# Patient Record
Sex: MALE | Race: WHITE | URBAN - METROPOLITAN AREA
[De-identification: names, ages, dates, MRNs, and addresses within clinical notes are randomized per-mention and may not be internally consistent; named-entity substitution may affect disease eponyms.]

---

## 2017-03-19 ENCOUNTER — EMERGENCY (EMERGENCY)
Facility: HOSPITAL | Age: 62
LOS: 0 days | Discharge: HOME | End: 2017-03-20
Admitting: FAMILY MEDICINE

## 2017-03-22 ENCOUNTER — INPATIENT (INPATIENT)
Facility: HOSPITAL | Age: 62
LOS: 9 days | Discharge: HOME | End: 2017-04-01
Attending: INTERNAL MEDICINE | Admitting: FAMILY MEDICINE

## 2017-04-07 PROBLEM — Z00.00 ENCOUNTER FOR PREVENTIVE HEALTH EXAMINATION: Status: ACTIVE | Noted: 2017-04-07

## 2017-04-24 ENCOUNTER — APPOINTMENT (OUTPATIENT)
Dept: CARDIOLOGY | Facility: CLINIC | Age: 62
End: 2017-04-24

## 2017-04-24 VITALS
HEIGHT: 71 IN | DIASTOLIC BLOOD PRESSURE: 76 MMHG | HEART RATE: 84 BPM | BODY MASS INDEX: 33.56 KG/M2 | SYSTOLIC BLOOD PRESSURE: 177 MMHG | WEIGHT: 239.7 LBS | OXYGEN SATURATION: 98 %

## 2017-04-24 DIAGNOSIS — F15.90 OTHER STIMULANT USE, UNSPECIFIED, UNCOMPLICATED: ICD-10-CM

## 2017-04-24 DIAGNOSIS — Z78.9 OTHER SPECIFIED HEALTH STATUS: ICD-10-CM

## 2017-04-24 DIAGNOSIS — Z80.9 FAMILY HISTORY OF MALIGNANT NEOPLASM, UNSPECIFIED: ICD-10-CM

## 2017-04-24 DIAGNOSIS — Z82.49 FAMILY HISTORY OF ISCHEMIC HEART DISEASE AND OTHER DISEASES OF THE CIRCULATORY SYSTEM: ICD-10-CM

## 2017-04-24 RX ORDER — FUROSEMIDE 40 MG/1
40 TABLET ORAL
Qty: 90 | Refills: 0 | Status: ACTIVE | COMMUNITY
Start: 2017-02-13

## 2017-04-24 RX ORDER — FLUTICASONE PROPIONATE 50 UG/1
50 SPRAY, METERED NASAL
Qty: 16 | Refills: 0 | Status: ACTIVE | COMMUNITY
Start: 2017-04-17

## 2017-06-03 PROBLEM — Z82.49 FAMILY HISTORY OF CONGESTIVE HEART FAILURE: Status: ACTIVE | Noted: 2017-04-24

## 2017-06-03 PROBLEM — Z80.9 FAMILY HISTORY OF MALIGNANT NEOPLASM: Status: ACTIVE | Noted: 2017-04-24

## 2017-06-03 PROBLEM — F15.90 CAFFEINE USE: Status: ACTIVE | Noted: 2017-04-24

## 2017-06-03 PROBLEM — Z78.9 SOCIAL ALCOHOL USE: Status: ACTIVE | Noted: 2017-04-24

## 2017-06-03 RX ORDER — RIVAROXABAN 20 MG/1
20 TABLET, FILM COATED ORAL
Qty: 90 | Refills: 0 | Status: ACTIVE | COMMUNITY
Start: 2016-09-27

## 2017-06-03 RX ORDER — SPIRONOLACTONE 25 MG/1
25 TABLET ORAL
Qty: 90 | Refills: 0 | Status: ACTIVE | COMMUNITY
Start: 2017-02-13

## 2017-06-03 RX ORDER — LOSARTAN POTASSIUM 100 MG/1
100 TABLET, FILM COATED ORAL
Qty: 90 | Refills: 0 | Status: ACTIVE | COMMUNITY
Start: 2017-02-13

## 2017-06-03 RX ORDER — SIMVASTATIN 40 MG/1
40 TABLET, FILM COATED ORAL
Qty: 90 | Refills: 0 | Status: ACTIVE | COMMUNITY
Start: 2017-02-13

## 2017-06-27 DIAGNOSIS — I16.1 HYPERTENSIVE EMERGENCY: ICD-10-CM

## 2017-06-27 DIAGNOSIS — J44.9 CHRONIC OBSTRUCTIVE PULMONARY DISEASE, UNSPECIFIED: ICD-10-CM

## 2017-06-27 DIAGNOSIS — J45.909 UNSPECIFIED ASTHMA, UNCOMPLICATED: ICD-10-CM

## 2017-06-27 DIAGNOSIS — I50.23 ACUTE ON CHRONIC SYSTOLIC (CONGESTIVE) HEART FAILURE: ICD-10-CM

## 2017-06-27 DIAGNOSIS — I21.4 NON-ST ELEVATION (NSTEMI) MYOCARDIAL INFARCTION: ICD-10-CM

## 2017-06-27 DIAGNOSIS — Z96.641 PRESENCE OF RIGHT ARTIFICIAL HIP JOINT: ICD-10-CM

## 2017-06-27 DIAGNOSIS — E78.5 HYPERLIPIDEMIA, UNSPECIFIED: ICD-10-CM

## 2017-06-27 DIAGNOSIS — Z79.01 LONG TERM (CURRENT) USE OF ANTICOAGULANTS: ICD-10-CM

## 2017-06-27 DIAGNOSIS — J20.8 ACUTE BRONCHITIS DUE TO OTHER SPECIFIED ORGANISMS: ICD-10-CM

## 2017-06-27 DIAGNOSIS — I25.10 ATHEROSCLEROTIC HEART DISEASE OF NATIVE CORONARY ARTERY WITHOUT ANGINA PECTORIS: ICD-10-CM

## 2017-06-27 DIAGNOSIS — I42.9 CARDIOMYOPATHY, UNSPECIFIED: ICD-10-CM

## 2017-06-27 DIAGNOSIS — B97.81 HUMAN METAPNEUMOVIRUS AS THE CAUSE OF DISEASES CLASSIFIED ELSEWHERE: ICD-10-CM

## 2017-06-27 DIAGNOSIS — I48.0 PAROXYSMAL ATRIAL FIBRILLATION: ICD-10-CM

## 2017-06-27 DIAGNOSIS — Z51.89 ENCOUNTER FOR OTHER SPECIFIED AFTERCARE: ICD-10-CM

## 2017-06-27 DIAGNOSIS — I45.81 LONG QT SYNDROME: ICD-10-CM

## 2017-06-27 DIAGNOSIS — J96.00 ACUTE RESPIRATORY FAILURE, UNSPECIFIED WHETHER WITH HYPOXIA OR HYPERCAPNIA: ICD-10-CM

## 2017-06-27 DIAGNOSIS — I11.0 HYPERTENSIVE HEART DISEASE WITH HEART FAILURE: ICD-10-CM

## 2017-10-09 ENCOUNTER — APPOINTMENT (OUTPATIENT)
Dept: CARDIOLOGY | Facility: CLINIC | Age: 62
End: 2017-10-09

## 2017-10-09 VITALS
SYSTOLIC BLOOD PRESSURE: 183 MMHG | HEART RATE: 88 BPM | WEIGHT: 263 LBS | BODY MASS INDEX: 36.82 KG/M2 | DIASTOLIC BLOOD PRESSURE: 84 MMHG | HEIGHT: 71 IN | OXYGEN SATURATION: 98 %

## 2017-10-09 DIAGNOSIS — I48.0 PAROXYSMAL ATRIAL FIBRILLATION: ICD-10-CM

## 2018-03-08 ENCOUNTER — TRANSCRIPTION ENCOUNTER (OUTPATIENT)
Age: 63
End: 2018-03-08

## 2018-04-16 ENCOUNTER — EMERGENCY (EMERGENCY)
Facility: HOSPITAL | Age: 63
LOS: 0 days | Discharge: HOME | End: 2018-04-16
Attending: EMERGENCY MEDICINE

## 2018-04-16 VITALS
RESPIRATION RATE: 20 BRPM | OXYGEN SATURATION: 96 % | TEMPERATURE: 101 F | SYSTOLIC BLOOD PRESSURE: 184 MMHG | HEART RATE: 100 BPM | DIASTOLIC BLOOD PRESSURE: 95 MMHG

## 2018-04-16 VITALS
DIASTOLIC BLOOD PRESSURE: 88 MMHG | SYSTOLIC BLOOD PRESSURE: 176 MMHG | OXYGEN SATURATION: 97 % | RESPIRATION RATE: 18 BRPM | HEART RATE: 99 BPM

## 2018-04-16 DIAGNOSIS — J18.9 PNEUMONIA, UNSPECIFIED ORGANISM: ICD-10-CM

## 2018-04-16 DIAGNOSIS — I11.0 HYPERTENSIVE HEART DISEASE WITH HEART FAILURE: ICD-10-CM

## 2018-04-16 DIAGNOSIS — I50.9 HEART FAILURE, UNSPECIFIED: ICD-10-CM

## 2018-04-16 DIAGNOSIS — I48.91 UNSPECIFIED ATRIAL FIBRILLATION: ICD-10-CM

## 2018-04-16 DIAGNOSIS — R06.00 DYSPNEA, UNSPECIFIED: ICD-10-CM

## 2018-04-16 DIAGNOSIS — R00.0 TACHYCARDIA, UNSPECIFIED: ICD-10-CM

## 2018-04-16 LAB
ALBUMIN SERPL ELPH-MCNC: 4.2 G/DL — SIGNIFICANT CHANGE UP (ref 3.5–5.2)
ALP SERPL-CCNC: 69 U/L — SIGNIFICANT CHANGE UP (ref 30–115)
ALT FLD-CCNC: 15 U/L — SIGNIFICANT CHANGE UP (ref 0–41)
ANION GAP SERPL CALC-SCNC: 11 MMOL/L — SIGNIFICANT CHANGE UP (ref 7–14)
AST SERPL-CCNC: 20 U/L — SIGNIFICANT CHANGE UP (ref 0–41)
BASOPHILS # BLD AUTO: 0.04 K/UL — SIGNIFICANT CHANGE UP (ref 0–0.2)
BASOPHILS NFR BLD AUTO: 0.4 % — SIGNIFICANT CHANGE UP (ref 0–1)
BILIRUB SERPL-MCNC: 0.4 MG/DL — SIGNIFICANT CHANGE UP (ref 0.2–1.2)
BUN SERPL-MCNC: 13 MG/DL — SIGNIFICANT CHANGE UP (ref 10–20)
CALCIUM SERPL-MCNC: 8.7 MG/DL — SIGNIFICANT CHANGE UP (ref 8.5–10.1)
CHLORIDE SERPL-SCNC: 100 MMOL/L — SIGNIFICANT CHANGE UP (ref 98–110)
CO2 SERPL-SCNC: 25 MMOL/L — SIGNIFICANT CHANGE UP (ref 17–32)
CREAT SERPL-MCNC: 1 MG/DL — SIGNIFICANT CHANGE UP (ref 0.7–1.5)
EOSINOPHIL # BLD AUTO: 0.19 K/UL — SIGNIFICANT CHANGE UP (ref 0–0.7)
EOSINOPHIL NFR BLD AUTO: 1.9 % — SIGNIFICANT CHANGE UP (ref 0–8)
GLUCOSE SERPL-MCNC: 127 MG/DL — HIGH (ref 70–99)
HCT VFR BLD CALC: 37.1 % — LOW (ref 42–52)
HGB BLD-MCNC: 12.3 G/DL — LOW (ref 14–18)
IMM GRANULOCYTES NFR BLD AUTO: 0.4 % — HIGH (ref 0.1–0.3)
LACTATE SERPL-SCNC: 1.2 MMOL/L — SIGNIFICANT CHANGE UP (ref 0.5–2.2)
LYMPHOCYTES # BLD AUTO: 0.93 K/UL — LOW (ref 1.2–3.4)
LYMPHOCYTES # BLD AUTO: 9.1 % — LOW (ref 20.5–51.1)
MCHC RBC-ENTMCNC: 28.6 PG — SIGNIFICANT CHANGE UP (ref 27–31)
MCHC RBC-ENTMCNC: 33.2 G/DL — SIGNIFICANT CHANGE UP (ref 32–37)
MCV RBC AUTO: 86.3 FL — SIGNIFICANT CHANGE UP (ref 80–94)
MONOCYTES # BLD AUTO: 1.12 K/UL — HIGH (ref 0.1–0.6)
MONOCYTES NFR BLD AUTO: 11 % — HIGH (ref 1.7–9.3)
NEUTROPHILS # BLD AUTO: 7.85 K/UL — HIGH (ref 1.4–6.5)
NEUTROPHILS NFR BLD AUTO: 77.2 % — HIGH (ref 42.2–75.2)
NT-PROBNP SERPL-SCNC: 746 PG/ML — HIGH (ref 0–300)
PLATELET # BLD AUTO: 229 K/UL — SIGNIFICANT CHANGE UP (ref 130–400)
POTASSIUM SERPL-MCNC: 4.6 MMOL/L — SIGNIFICANT CHANGE UP (ref 3.5–5)
POTASSIUM SERPL-SCNC: 4.6 MMOL/L — SIGNIFICANT CHANGE UP (ref 3.5–5)
PROT SERPL-MCNC: 6.9 G/DL — SIGNIFICANT CHANGE UP (ref 6–8)
RBC # BLD: 4.3 M/UL — LOW (ref 4.7–6.1)
RBC # FLD: 13.7 % — SIGNIFICANT CHANGE UP (ref 11.5–14.5)
SODIUM SERPL-SCNC: 136 MMOL/L — SIGNIFICANT CHANGE UP (ref 135–146)
TROPONIN T SERPL-MCNC: <0.01 NG/ML — SIGNIFICANT CHANGE UP
WBC # BLD: 10.17 K/UL — SIGNIFICANT CHANGE UP (ref 4.8–10.8)
WBC # FLD AUTO: 10.17 K/UL — SIGNIFICANT CHANGE UP (ref 4.8–10.8)

## 2018-04-16 RX ORDER — CIPROFLOXACIN LACTATE 400MG/40ML
1 VIAL (ML) INTRAVENOUS
Qty: 7 | Refills: 0 | OUTPATIENT
Start: 2018-04-16 | End: 2018-04-22

## 2018-04-16 RX ORDER — DEXAMETHASONE 0.5 MG/5ML
10 ELIXIR ORAL ONCE
Qty: 0 | Refills: 0 | Status: COMPLETED | OUTPATIENT
Start: 2018-04-16 | End: 2018-04-16

## 2018-04-16 RX ORDER — DEXAMETHASONE 0.5 MG/5ML
10 ELIXIR ORAL ONCE
Qty: 0 | Refills: 0 | Status: DISCONTINUED | OUTPATIENT
Start: 2018-04-16 | End: 2018-04-16

## 2018-04-16 RX ADMIN — Medication 10 MILLIGRAM(S): at 07:24

## 2018-04-16 NOTE — ED PROVIDER NOTE - NS ED ROS FT
Constitutional: No fever, chills.  Eyes: No visual changes.  ENT: No hearing changes. No sore throat.  Neck: No neck pain or stiffness.  Cardiovascular: No chest pain, palpitations, edema.  Pulmonary: + SOB, cough. No hemoptysis.  Abdominal: No abdominal pain, nausea, vomiting, diarrhea.  : No dysuria, frequency.  Neuro: No headache, syncope, dizziness.  MS: No back pain. No calf pain/swelling.  Psych: No suicidal ideations.

## 2018-04-16 NOTE — ED PROVIDER NOTE - PROGRESS NOTE DETAILS
Labs, imaging reviewed. HR alternates between 60 and 110 which is normal per patient. Patient feels comfortable going home. Will give decadron and give levaquin for clinical pna. Strict return precautions given.

## 2018-04-16 NOTE — ED ADULT NURSE NOTE - OBJECTIVE STATEMENT
patient admitted to the ED with complaints of wheezing and non productive cough. Patient denies chest pain.  Patient denies n/v and no dizziness. Patient Osat on room air is 97%

## 2018-04-16 NOTE — ED PROVIDER NOTE - PHYSICAL EXAMINATION
Constitutional: Well developed, well nourished. NAD.  Head: Normocephalic, atraumatic.  Eyes: PERRL. EOMI.  ENT: No nasal discharge. Mucous membranes moist.  Neck: Supple. Painless ROM.  Cardiovascular: Normal S1, S2. Regular rate and rhythm. No murmurs, rubs, or gallops.  Pulmonary: Normal respiratory rate and effort. Diffuse expiratory wheezing with right basilar rhonchi.  Abdominal: Soft. Nondistended. Nontender. No rebound, guarding, rigidity.  Extremities. Pelvis stable. No lower extremity edema, symmetric calves.  Skin: No rashes, cyanosis.  Neuro: AAOx3. No focal neurological deficits.  Psych: Normal mood. Normal affect. Constitutional: Well developed, well nourished. NAD.  Head: Normocephalic, atraumatic.  Eyes: PERRL. EOMI.  ENT: No nasal discharge. Mucous membranes moist.  Neck: Supple. Painless ROM.  Cardiovascular: Normal S1, S2. Tachycardic, irregularly irregular. No murmurs, rubs, or gallops.  Pulmonary: Normal respiratory rate and effort. Diffuse expiratory wheezing with right basilar rhonchi.  Abdominal: Soft. Nondistended. Nontender. No rebound, guarding, rigidity.  Extremities. Pelvis stable. No lower extremity edema, symmetric calves.  Skin: No rashes, cyanosis.  Neuro: AAOx3. No focal neurological deficits.  Psych: Normal mood. Normal affect.

## 2018-04-16 NOTE — ED PROVIDER NOTE - OBJECTIVE STATEMENT
Pt is a 61 y/o male with hx of HTN, DLD, afib on xarelto, CHF on lasix, presents to ED for SOB for two days, + orthopnea, BELLAMY. + productive cough. Pt denies chest pain, fever, abd pain, n/v, diaphoresis, leg pain/swelling. Pt states sx's feel similar to when pt went into pulmonary edema last year.

## 2018-04-17 ENCOUNTER — EMERGENCY (EMERGENCY)
Facility: HOSPITAL | Age: 63
LOS: 0 days | Discharge: HOME | End: 2018-04-17
Attending: EMERGENCY MEDICINE | Admitting: EMERGENCY MEDICINE

## 2018-04-17 VITALS
DIASTOLIC BLOOD PRESSURE: 104 MMHG | OXYGEN SATURATION: 93 % | HEART RATE: 104 BPM | TEMPERATURE: 100 F | SYSTOLIC BLOOD PRESSURE: 202 MMHG | RESPIRATION RATE: 22 BRPM

## 2018-04-17 DIAGNOSIS — I11.0 HYPERTENSIVE HEART DISEASE WITH HEART FAILURE: ICD-10-CM

## 2018-04-17 DIAGNOSIS — Z79.899 OTHER LONG TERM (CURRENT) DRUG THERAPY: ICD-10-CM

## 2018-04-17 DIAGNOSIS — Z79.01 LONG TERM (CURRENT) USE OF ANTICOAGULANTS: ICD-10-CM

## 2018-04-17 DIAGNOSIS — R05 COUGH: ICD-10-CM

## 2018-04-17 DIAGNOSIS — I50.9 HEART FAILURE, UNSPECIFIED: ICD-10-CM

## 2018-04-17 DIAGNOSIS — R06.02 SHORTNESS OF BREATH: ICD-10-CM

## 2018-04-17 DIAGNOSIS — I48.91 UNSPECIFIED ATRIAL FIBRILLATION: ICD-10-CM

## 2018-04-17 LAB
APTT BLD: 30.2 SEC — SIGNIFICANT CHANGE UP (ref 27–39.2)
INR BLD: 1.6 RATIO — HIGH (ref 0.65–1.3)
PROTHROM AB SERPL-ACNC: 17.4 SEC — HIGH (ref 9.95–12.87)

## 2018-04-17 RX ORDER — IPRATROPIUM/ALBUTEROL SULFATE 18-103MCG
3 AEROSOL WITH ADAPTER (GRAM) INHALATION
Qty: 0 | Refills: 0 | Status: COMPLETED | OUTPATIENT
Start: 2018-04-17 | End: 2018-04-17

## 2018-04-17 RX ADMIN — Medication 3 MILLILITER(S): at 22:53

## 2018-04-17 RX ADMIN — Medication 125 MILLIGRAM(S): at 22:53

## 2018-04-17 RX ADMIN — Medication 3 MILLILITER(S): at 22:55

## 2018-04-17 RX ADMIN — Medication 3 MILLILITER(S): at 22:46

## 2018-04-17 NOTE — ED ADULT NURSE NOTE - OBJECTIVE STATEMENT
pt has cough, shortness of breath. was here on monday, came back today because cough did not resolve

## 2018-04-17 NOTE — ED PROVIDER NOTE - PHYSICAL EXAMINATION
VITAL SIGNS: I have reviewed nursing notes and confirm.  CONSTITUTIONAL: Well-developed; well-nourished; in no acute distress.  SKIN: Skin exam is warm and dry, no acute rash.  HEAD: Normocephalic; atraumatic.  EYES: PERRL, EOM intact; conjunctiva and sclera clear.  ENT: No nasal discharge; airway clear.  NECK: Supple; non tender. No jvd.  CARD: S1, S2 normal; no murmurs, gallops, or rubs. Regular rate.  RESP: breathing comfortably speakinf in full sentences, no access muscle use, +diff wheezing/rhonchi bl, no rales  ABD: Normal bowel sounds; soft; non-distended; non-tender; no hepatosplenomegaly.  EXT: Normal ROM. No clubbing, cyanosis or edema. DPI.  NEURO: Alert, oriented. Grossly unremarkable. No focal deficits.  PSYCH: Cooperative, appropriate.

## 2018-04-17 NOTE — ED PROVIDER NOTE - OBJECTIVE STATEMENT
61yo m non-smoker h/o chf on lasix/spironolactone, AF on xarelto, htn p/w worsening cough x 3d. Rpts productive cough, accomp by sob. Seen in ED yest for same, had labs & cxr done, given nebs & steroids, discharged to home w/Rx for levaquin for clinical PNA. Pt rpts unimproved sx since then, has taken 2 doses of po levaquin thus far. Denies f/c, cp, nv, abd pain, edema, diaphoresis.

## 2018-04-17 NOTE — ED PROVIDER NOTE - MEDICAL DECISION MAKING DETAILS
worsening cough, wheezing/rhonchi, on Day 2 of abx tx for clinical PNA w/continued sx - ekg, cxr, labs, nebs/steroids, reassess

## 2018-04-17 NOTE — ED PROVIDER NOTE - PROGRESS NOTE DETAILS
pt feeling much better after nebs/steroids, pt and wife requesting to go home and f/u with PMD Dr. Rangel as outpt - will give new Rx for levaquin 750mg x 5 (initially given 500mg once

## 2018-04-18 VITALS
RESPIRATION RATE: 18 BRPM | TEMPERATURE: 99 F | DIASTOLIC BLOOD PRESSURE: 91 MMHG | OXYGEN SATURATION: 98 % | HEART RATE: 91 BPM | SYSTOLIC BLOOD PRESSURE: 132 MMHG

## 2018-04-18 LAB
ALBUMIN SERPL ELPH-MCNC: 4.2 G/DL — SIGNIFICANT CHANGE UP (ref 3.5–5.2)
ALP SERPL-CCNC: 67 U/L — SIGNIFICANT CHANGE UP (ref 30–115)
ALT FLD-CCNC: 15 U/L — SIGNIFICANT CHANGE UP (ref 0–41)
ANION GAP SERPL CALC-SCNC: 16 MMOL/L — HIGH (ref 7–14)
AST SERPL-CCNC: 24 U/L — SIGNIFICANT CHANGE UP (ref 0–41)
BASOPHILS # BLD AUTO: 0.04 K/UL — SIGNIFICANT CHANGE UP (ref 0–0.2)
BASOPHILS NFR BLD AUTO: 0.3 % — SIGNIFICANT CHANGE UP (ref 0–1)
BILIRUB SERPL-MCNC: 0.5 MG/DL — SIGNIFICANT CHANGE UP (ref 0.2–1.2)
BUN SERPL-MCNC: 25 MG/DL — HIGH (ref 10–20)
CALCIUM SERPL-MCNC: 8.6 MG/DL — SIGNIFICANT CHANGE UP (ref 8.5–10.1)
CHLORIDE SERPL-SCNC: 100 MMOL/L — SIGNIFICANT CHANGE UP (ref 98–110)
CK MB CFR SERPL CALC: 4.3 NG/ML — SIGNIFICANT CHANGE UP (ref 0.6–6.3)
CK SERPL-CCNC: 333 U/L — HIGH (ref 0–225)
CO2 SERPL-SCNC: 23 MMOL/L — SIGNIFICANT CHANGE UP (ref 17–32)
CREAT SERPL-MCNC: 1.1 MG/DL — SIGNIFICANT CHANGE UP (ref 0.7–1.5)
EOSINOPHIL # BLD AUTO: 0.03 K/UL — SIGNIFICANT CHANGE UP (ref 0–0.7)
EOSINOPHIL NFR BLD AUTO: 0.2 % — SIGNIFICANT CHANGE UP (ref 0–8)
GLUCOSE SERPL-MCNC: 115 MG/DL — HIGH (ref 70–99)
HCT VFR BLD CALC: 38.7 % — LOW (ref 42–52)
HGB BLD-MCNC: 13 G/DL — LOW (ref 14–18)
IMM GRANULOCYTES NFR BLD AUTO: 0.3 % — SIGNIFICANT CHANGE UP (ref 0.1–0.3)
LYMPHOCYTES # BLD AUTO: 0.83 K/UL — LOW (ref 1.2–3.4)
LYMPHOCYTES # BLD AUTO: 6.8 % — LOW (ref 20.5–51.1)
MCHC RBC-ENTMCNC: 28.5 PG — SIGNIFICANT CHANGE UP (ref 27–31)
MCHC RBC-ENTMCNC: 33.6 G/DL — SIGNIFICANT CHANGE UP (ref 32–37)
MCV RBC AUTO: 84.9 FL — SIGNIFICANT CHANGE UP (ref 80–94)
MONOCYTES # BLD AUTO: 1.5 K/UL — HIGH (ref 0.1–0.6)
MONOCYTES NFR BLD AUTO: 12.2 % — HIGH (ref 1.7–9.3)
NEUTROPHILS # BLD AUTO: 9.84 K/UL — HIGH (ref 1.4–6.5)
NEUTROPHILS NFR BLD AUTO: 80.2 % — HIGH (ref 42.2–75.2)
NRBC # BLD: 0 /100 WBCS — SIGNIFICANT CHANGE UP (ref 0–0)
NT-PROBNP SERPL-SCNC: 638 PG/ML — HIGH (ref 0–300)
PLATELET # BLD AUTO: 253 K/UL — SIGNIFICANT CHANGE UP (ref 130–400)
POTASSIUM SERPL-MCNC: 4.4 MMOL/L — SIGNIFICANT CHANGE UP (ref 3.5–5)
POTASSIUM SERPL-SCNC: 4.4 MMOL/L — SIGNIFICANT CHANGE UP (ref 3.5–5)
PROT SERPL-MCNC: 7 G/DL — SIGNIFICANT CHANGE UP (ref 6–8)
RBC # BLD: 4.56 M/UL — LOW (ref 4.7–6.1)
RBC # FLD: 13.8 % — SIGNIFICANT CHANGE UP (ref 11.5–14.5)
SODIUM SERPL-SCNC: 139 MMOL/L — SIGNIFICANT CHANGE UP (ref 135–146)
TROPONIN T SERPL-MCNC: <0.01 NG/ML — SIGNIFICANT CHANGE UP
WBC # BLD: 12.28 K/UL — HIGH (ref 4.8–10.8)
WBC # FLD AUTO: 12.28 K/UL — HIGH (ref 4.8–10.8)

## 2018-04-18 RX ORDER — ALBUTEROL 90 UG/1
3 AEROSOL, METERED ORAL
Qty: 30 | Refills: 0 | OUTPATIENT
Start: 2018-04-18 | End: 2018-04-24

## 2018-04-20 ENCOUNTER — INPATIENT (INPATIENT)
Facility: HOSPITAL | Age: 63
LOS: 2 days | Discharge: HOME | End: 2018-04-23
Attending: INTERNAL MEDICINE | Admitting: INTERNAL MEDICINE

## 2018-04-20 VITALS
TEMPERATURE: 97 F | OXYGEN SATURATION: 96 % | RESPIRATION RATE: 20 BRPM | HEART RATE: 66 BPM | DIASTOLIC BLOOD PRESSURE: 112 MMHG | SYSTOLIC BLOOD PRESSURE: 215 MMHG

## 2018-04-20 DIAGNOSIS — Z98.890 OTHER SPECIFIED POSTPROCEDURAL STATES: Chronic | ICD-10-CM

## 2018-04-20 LAB
ALBUMIN SERPL ELPH-MCNC: 4.2 G/DL — SIGNIFICANT CHANGE UP (ref 3.5–5.2)
ALP SERPL-CCNC: 66 U/L — SIGNIFICANT CHANGE UP (ref 30–115)
ALT FLD-CCNC: 21 U/L — SIGNIFICANT CHANGE UP (ref 0–41)
ANION GAP SERPL CALC-SCNC: 11 MMOL/L — SIGNIFICANT CHANGE UP (ref 7–14)
APTT BLD: 31.1 SEC — SIGNIFICANT CHANGE UP (ref 27–39.2)
AST SERPL-CCNC: 24 U/L — SIGNIFICANT CHANGE UP (ref 0–41)
BILIRUB SERPL-MCNC: 0.6 MG/DL — SIGNIFICANT CHANGE UP (ref 0.2–1.2)
BUN SERPL-MCNC: 28 MG/DL — HIGH (ref 10–20)
CALCIUM SERPL-MCNC: 8.6 MG/DL — SIGNIFICANT CHANGE UP (ref 8.5–10.1)
CHLORIDE SERPL-SCNC: 97 MMOL/L — LOW (ref 98–110)
CK SERPL-CCNC: 102 U/L — SIGNIFICANT CHANGE UP (ref 0–225)
CO2 SERPL-SCNC: 29 MMOL/L — SIGNIFICANT CHANGE UP (ref 17–32)
CREAT SERPL-MCNC: 1 MG/DL — SIGNIFICANT CHANGE UP (ref 0.7–1.5)
GLUCOSE SERPL-MCNC: 137 MG/DL — HIGH (ref 70–99)
HCT VFR BLD CALC: 40.8 % — LOW (ref 42–52)
HGB BLD-MCNC: 13.6 G/DL — LOW (ref 14–18)
INR BLD: 2.53 RATIO — HIGH (ref 0.65–1.3)
MCHC RBC-ENTMCNC: 28.6 PG — SIGNIFICANT CHANGE UP (ref 27–31)
MCHC RBC-ENTMCNC: 33.3 G/DL — SIGNIFICANT CHANGE UP (ref 32–37)
MCV RBC AUTO: 85.7 FL — SIGNIFICANT CHANGE UP (ref 80–94)
NRBC # BLD: 0 /100 WBCS — SIGNIFICANT CHANGE UP (ref 0–0)
NT-PROBNP SERPL-SCNC: 298 PG/ML — SIGNIFICANT CHANGE UP (ref 0–300)
PLATELET # BLD AUTO: 275 K/UL — SIGNIFICANT CHANGE UP (ref 130–400)
POTASSIUM SERPL-MCNC: 4.4 MMOL/L — SIGNIFICANT CHANGE UP (ref 3.5–5)
POTASSIUM SERPL-SCNC: 4.4 MMOL/L — SIGNIFICANT CHANGE UP (ref 3.5–5)
PROT SERPL-MCNC: 7.2 G/DL — SIGNIFICANT CHANGE UP (ref 6–8)
PROTHROM AB SERPL-ACNC: 27.9 SEC — HIGH (ref 9.95–12.87)
RBC # BLD: 4.76 M/UL — SIGNIFICANT CHANGE UP (ref 4.7–6.1)
RBC # FLD: 13.6 % — SIGNIFICANT CHANGE UP (ref 11.5–14.5)
SODIUM SERPL-SCNC: 137 MMOL/L — SIGNIFICANT CHANGE UP (ref 135–146)
TROPONIN T SERPL-MCNC: <0.01 NG/ML — SIGNIFICANT CHANGE UP
TROPONIN T SERPL-MCNC: <0.01 NG/ML — SIGNIFICANT CHANGE UP
WBC # BLD: 11.91 K/UL — HIGH (ref 4.8–10.8)
WBC # FLD AUTO: 11.91 K/UL — HIGH (ref 4.8–10.8)

## 2018-04-20 RX ORDER — FUROSEMIDE 40 MG
1 TABLET ORAL
Qty: 0 | Refills: 0 | COMMUNITY

## 2018-04-20 RX ORDER — FUROSEMIDE 40 MG
40 TABLET ORAL DAILY
Qty: 0 | Refills: 0 | Status: DISCONTINUED | OUTPATIENT
Start: 2018-04-20 | End: 2018-04-23

## 2018-04-20 RX ORDER — LOSARTAN POTASSIUM 100 MG/1
100 TABLET, FILM COATED ORAL DAILY
Qty: 0 | Refills: 0 | Status: DISCONTINUED | OUTPATIENT
Start: 2018-04-20 | End: 2018-04-23

## 2018-04-20 RX ORDER — RIVAROXABAN 15 MG-20MG
20 KIT ORAL EVERY 24 HOURS
Qty: 0 | Refills: 0 | Status: DISCONTINUED | OUTPATIENT
Start: 2018-04-20 | End: 2018-04-23

## 2018-04-20 RX ORDER — SIMVASTATIN 20 MG/1
40 TABLET, FILM COATED ORAL AT BEDTIME
Qty: 0 | Refills: 0 | Status: DISCONTINUED | OUTPATIENT
Start: 2018-04-20 | End: 2018-04-23

## 2018-04-20 RX ORDER — FUROSEMIDE 40 MG
40 TABLET ORAL ONCE
Qty: 0 | Refills: 0 | Status: COMPLETED | OUTPATIENT
Start: 2018-04-20 | End: 2018-04-20

## 2018-04-20 RX ORDER — FUROSEMIDE 40 MG
40 TABLET ORAL EVERY 12 HOURS
Qty: 0 | Refills: 0 | Status: DISCONTINUED | OUTPATIENT
Start: 2018-04-20 | End: 2018-04-20

## 2018-04-20 RX ORDER — METOPROLOL TARTRATE 50 MG
100 TABLET ORAL
Qty: 0 | Refills: 0 | Status: DISCONTINUED | OUTPATIENT
Start: 2018-04-20 | End: 2018-04-23

## 2018-04-20 RX ORDER — ALBUTEROL 90 UG/1
2 AEROSOL, METERED ORAL EVERY 6 HOURS
Qty: 0 | Refills: 0 | Status: DISCONTINUED | OUTPATIENT
Start: 2018-04-20 | End: 2018-04-23

## 2018-04-20 RX ORDER — SPIRONOLACTONE 25 MG/1
25 TABLET, FILM COATED ORAL DAILY
Qty: 0 | Refills: 0 | Status: DISCONTINUED | OUTPATIENT
Start: 2018-04-20 | End: 2018-04-23

## 2018-04-20 RX ADMIN — Medication 40 MILLIGRAM(S): at 12:30

## 2018-04-20 RX ADMIN — SIMVASTATIN 40 MILLIGRAM(S): 20 TABLET, FILM COATED ORAL at 21:06

## 2018-04-20 RX ADMIN — Medication 20 MILLIGRAM(S): at 21:06

## 2018-04-20 RX ADMIN — Medication 100 MILLIGRAM(S): at 17:29

## 2018-04-20 RX ADMIN — LOSARTAN POTASSIUM 100 MILLIGRAM(S): 100 TABLET, FILM COATED ORAL at 17:32

## 2018-04-20 RX ADMIN — RIVAROXABAN 20 MILLIGRAM(S): KIT at 17:29

## 2018-04-20 NOTE — H&P ADULT - NSHPREVIEWOFSYSTEMS_GEN_ALL_CORE
Constitutional: No fever, chills, unintended weight loss  Cardiac: No chest pain, +SOB, no edema. No chest pain with exertion.  Respiratory: mild wheezing, crackles minimally at the base  GI: No nausea, vomiting, diarrhea or abdominal pain.  MS: No myalgia, muscle weakness, joint pain or back pain.  Neuro: No headache or weakness. No LOC

## 2018-04-20 NOTE — ED PROVIDER NOTE - OBJECTIVE STATEMENT
63 y/o M pmh of CHF, a fib on xeralto presents with SOB over the past 3 days. Patient report orthopnea. No fevers, chills. No nausea, vomiting. No abd pain. No back pain.

## 2018-04-20 NOTE — ED PROVIDER NOTE - ATTENDING CONTRIBUTION TO CARE
63 y/o M pmh a noted, p/w persistent cough, wheeze, sob, leg swelling and orthopnea.  Seen twice this week for similar complaint.  Has had cough and fever, onset about 1 wk ago, started on abx, fever resolved, but cough persisted, w/ gradual onset of other sx.  No cp, travel, v/d.  PE as above, + b/l rale and faint wheeze, + pitting edema.  IMP: PNA vs fluid overload.  P: labs, ekg, cxr, O2 as needed, reassess.

## 2018-04-20 NOTE — H&P ADULT - NSHPPHYSICALEXAM_GEN_ALL_CORE
General: Well-developed; well-nourished; in no acute distress, AOx3  NECK: Supple; non tender. No jvd  CARD: irregular rate  RESP: mild crackles and wheezing bibasilar  ABD: Normal bowel sounds; soft; non-distended; non-tender; no hepatosplenomegaly  EXT: Normal ROM. No clubbing, cyanosis or edema. DPI  NEURO: Alert, oriented. Grossly unremarkable. No focal deficits

## 2018-04-20 NOTE — H&P ADULT - HISTORY OF PRESENT ILLNESS
63 yo M with pmh of CHF on lasix/spironolactone, AF on xarelto, HTN presenting with shortness of breath for 3 days. He states he was in the hospital 1 week ago for pna discharged with levaquin and prednisone. Patient states he was compliant with medication. 3 days ago patient started to feel worsening shortness of breath, leg swelling, and was unable to lay down flat. He endorses that 5 days ago he had an episode of palpitations however resolved after 5 min.  He states 1 year ago, he was admitted for CHF exacerbation from pna as well. He denies any chills, nausea, vomiting, abd pain, and urinary/bowel incontinence. 63 yo M with pmh of CHF on lasix/spironolactone, AF on xarelto, HTN presenting with shortness of breath for 3 days. He states he was in the hospital 1 week ago for pna discharged with levaquin and prednisone. Patient states he was compliant with medication. 3 days ago patient started to feel worsening shortness of breath, leg swelling, and was unable to lay down flat. He endorses that 5 days ago he had an episode of palpitations however resolved after 5 min.  He states 1 year ago, he was admitted for CHF exacerbation from pna as well. He denies any chills, nausea, vomiting, abd pain, and urinary/bowel incontinence.      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

## 2018-04-20 NOTE — H&P ADULT - ATTENDING COMMENTS
Patient seen and examined independently. Agree with resident note. Case discussed with housestaff, nursing and patient/pt decision maker.   VITAL SIGNS (Last 24 hrs):  T(C): 35.6 (04-21-18 @ 13:48), Max: 36.5 (04-20-18 @ 20:20)  HR: 99 (04-21-18 @ 13:48) (82 - 99)  BP: 134/72 (04-21-18 @ 13:48) (124/82 - 172/135)  RR: 18 (04-21-18 @ 13:48) (18 - 18)  SpO2: 96% (04-21-18 @ 09:10) (96% - 98%)  Wt(kg): --  Daily     Daily     I&O's Summary    20 Apr 2018 07:01  -  21 Apr 2018 07:00  --------------------------------------------------------  IN: 240 mL / OUT: 1400 mL / NET: -1160 mL                          13.6   11.32 )-----------( 301      ( 21 Apr 2018 07:07 )             41.3   04-21    137  |  100  |  29<H>  ----------------------------<  128<H>  4.7   |  29  |  1.0    Ca    8.5      21 Apr 2018 07:07    TPro  7.2  /  Alb  4.2  /  TBili  0.6  /  DBili  x   /  AST  24  /  ALT  21  /  AlkPhos  66  04-20  ekg- afib with RVR [id number]/min  O/E  AAOX3  CHEST-B/L AIR ENTRY  CVS-S1S2N  ABD-SOFT, BS+  NO EDEMA  ASSESSMENT AND PLAN  #SOB- sec to bronchitis on levaquin and bronchodilators. had normal echo and cath in march 2017  # a fib on xarelto rate is controlled Patient seen and examined independently. Agree with resident note. Case discussed with housestaff, nursing and patient/pt decision maker.   VITAL SIGNS (Last 24 hrs):  T(C): 35.6 (04-21-18 @ 13:48), Max: 36.5 (04-20-18 @ 20:20)  HR: 99 (04-21-18 @ 13:48) (82 - 99)  BP: 134/72 (04-21-18 @ 13:48) (124/82 - 172/135)  RR: 18 (04-21-18 @ 13:48) (18 - 18)  SpO2: 96% (04-21-18 @ 09:10) (96% - 98%)  Wt(kg): --  Daily     Daily     I&O's Summary    20 Apr 2018 07:01  -  21 Apr 2018 07:00  --------------------------------------------------------  IN: 240 mL / OUT: 1400 mL / NET: -1160 mL                          13.6   11.32 )-----------( 301      ( 21 Apr 2018 07:07 )             41.3   04-21    137  |  100  |  29<H>  ----------------------------<  128<H>  4.7   |  29  |  1.0    Ca    8.5      21 Apr 2018 07:07    TPro  7.2  /  Alb  4.2  /  TBili  0.6  /  DBili  x   /  AST  24  /  ALT  21  /  AlkPhos  66  04-20  ekg- afib with RVR [id number]/min  O/E  AAOX3  CHEST-B/L AIR ENTRY  CVS-S1S2N  ABD-SOFT, BS+  NO EDEMA  ASSESSMENT AND PLAN  #SOB- sec to bronchitis on levaquin and bronchodilators. had normal echo and cath in march 2017. Decrease dose of lasix to po. will continue with prednisone and bronchodilators. 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

## 2018-04-20 NOTE — ED ADULT NURSE NOTE - OBJECTIVE STATEMENT
Patient was here on Sunday given Levaquin.  Patient returned on Wednesday Levaquin was increased to 750 mg and placed on predisone and respiratory treatments.  Patient returned feeling worse.  c/o of SOB and cough SOB worse with exertion

## 2018-04-20 NOTE — H&P ADULT - NSHPLABSRESULTS_GEN_ALL_CORE
Medications:    MEDICATIONS  (STANDING):  furosemide   Injectable 40 milliGRAM(s) IV Push every 12 hours  levoFLOXacin  Tablet 750 milliGRAM(s) Oral every 24 hours  losartan 100 milliGRAM(s) Oral daily  metoprolol tartrate 100 milliGRAM(s) Oral two times a day  predniSONE   Tablet 20 milliGRAM(s) Oral three times a day  rivaroxaban 20 milliGRAM(s) Oral every 24 hours  simvastatin 40 milliGRAM(s) Oral at bedtime  spironolactone 25 milliGRAM(s) Oral daily    MEDICATIONS  (PRN):  ALBUTerol    90 MICROgram(s) HFA Inhaler 2 Puff(s) Inhalation every 6 hours PRN Bronchospasm      Vitals:    Vital Signs Last 24 Hrs  T(C): 36.6 (20 Apr 2018 13:56), Max: 36.6 (20 Apr 2018 13:56)  T(F): 97.8 (20 Apr 2018 13:56), Max: 97.8 (20 Apr 2018 13:56)  HR: 102 (20 Apr 2018 15:07) (66 - 114)  BP: 162/80 (20 Apr 2018 15:07) (141/84 - 215/112)  BP(mean): --  RR: 20 (20 Apr 2018 15:07) (20 - 20)  SpO2: 96% (20 Apr 2018 15:07) (96% - 98%)    Labs:     04-20    137  |  97<L>  |  28<H>  ----------------------------<  137<H>  4.4   |  29  |  1.0    Ca    8.6      20 Apr 2018 10:52    TPro  7.2  /  Alb  4.2  /  TBili  0.6  /  DBili  x   /  AST  24  /  ALT  21  /  AlkPhos  66  04-20                          13.6   11.91 )-----------( 275      ( 20 Apr 2018 10:52 )             40.8     CARDIAC MARKERS ( 20 Apr 2018 10:52 )  x     / <0.01 ng/mL / x     / x     / x          LIVER FUNCTIONS - ( 20 Apr 2018 10:52 )  Alb: 4.2 g/dL / Pro: 7.2 g/dL / ALK PHOS: 66 U/L / ALT: 21 U/L / AST: 24 U/L / GGT: x           PT/INR - ( 20 Apr 2018 10:52 )   PT: 27.90 sec;   INR: 2.53 ratio         PTT - ( 20 Apr 2018 10:52 )  PTT:31.1 sec      Cultures:

## 2018-04-20 NOTE — H&P ADULT - ASSESSMENT
61 yo M with pmh of CHF on lasix/spironolactone, AF on xarelto, HTN presenting with shortness of breath for 3 days. Likely CHF exacerbation 2/2 to pna vs Afib.    1) CHF excerbation 2/2 arrythmia vs pna: EKG and telemonitor showing Afib. less likely to be pna given that CXR showing no evidence of consolidation and pt symptoms improved from pna 1 week ago compliant with abx. BNP was 290  - admit to Tele  - repeat EKG  - repeat CXR in am  - f/u CE   - c/w IV lasix 40mg bid  - c/w spironolactone    2) Afib: on xarelto: active afib  - c/w xarelto  - c/w metoprolol     3) HTN: stable  - c/w losartan    4) DLD  - c/w statin    DVT ppx: continue with xarelto    DISPO: from home when medically stable, full code 63 yo M with pmh of CHF on lasix/spironolactone, AF on xarelto, HTN presenting with shortness of breath for 3 days. Likely CHF exacerbation 2/2 to pna vs Afib.    1) CHF excerbation 2/2 arrythmia vs pna: EKG and telemonitor showing Afib. less likely to be pna given that CXR showing no evidence of consolidation and pt symptoms improved from pna 1 week ago compliant with abx. BNP was 290  - admit to Tele  - repeat EKG  - repeat CXR in am  - f/u CE   - c/w IV lasix 40mg bid  - c/w spironolactone  - daily weights and in and out  - cardiology consult ():     2) Afib: on xarelto: active afib  - c/w xarelto  - c/w metoprolol     3) HTN: stable  - c/w losartan    4) DLD  - c/w statin    DVT ppx: continue with xarelto    DISPO: from home when medically stable, full code 63 yo M with pmh of CHF on lasix/spironolactone, AF on xarelto, HTN presenting with shortness of breath for 3 days. CXR clear, .    1) SOB 2/2 unknown etiology as less likely CHF excerbation as patient has clear CXR and wnl bnp, less likely pna was patient CXR was clear 1 week ago and current CXR clear, potentially post viral vs asbestosis from occupation history. Lung exam shows moderate crackles. Will need pulm outpatinet followup with high resolution CT to further clarify etiology.  - admit to Tele  - repeat EKG  - repeat CXR in am  - f/u CE   - c/w PO lasix 40mg bid  - c/w spironolactone  - daily weights and in and out  - consider cardiology consult ([facility])  - consider pulm consult  - c/w prednisone for 2 more days, levaquin for 2 more days to complete course  - VBG and venous duplex as L>R edema: r/o DVT    2) Afib: on xarelto: active afib  - c/w xarelto  - c/w metoprolol     3) HTN: stable  - c/w losartan    4) DLD  - c/w statin    DVT ppx: continue with xarelto    DISPO: from home when medically stable, full code 61 yo M with pmh of CHF on lasix/spironolactone, AF on xarelto, HTN presenting with shortness of breath for 3 days. CXR clear, .    1) SOB 2/2 unknown etiology as less likely CHF excerbation as patient has clear CXR and wnl bnp, less likely pna was patient CXR was clear 1 week ago and current CXR clear, potentially post viral vs asbestosis from occupation history. Lung exam shows moderate crackles. Will need pulm outpatinet followup with high resolution CT to further clarify etiology.  - admit to Tele  - repeat EKG  - repeat CXR in am  - f/u CE   - c/w PO lasix 40mg bid  - c/w spironolactone  - daily weights and in and out  - consider cardiology consult ([facility])  - consider pulm consult  - c/w prednisone for 2 more days, levaquin for 2 more days to complete course  - VBG and venous duplex as L>R edema: r/o DVT  - consider pulm followup as outpatient for high resolution CT of chest to evaluate for asbestosis and sleep study for CORNELIO (8/8 for [employer] questions)    2) Afib: on xarelto: active afib  - c/w xarelto  - c/w metoprolol     3) HTN: stable  - c/w losartan    4) DLD  - c/w statin    DVT ppx: continue with xarelto    DISPO: from home when medically stable, full code

## 2018-04-21 LAB
ANION GAP SERPL CALC-SCNC: 8 MMOL/L — SIGNIFICANT CHANGE UP (ref 7–14)
BASOPHILS # BLD AUTO: 0.02 K/UL — SIGNIFICANT CHANGE UP (ref 0–0.2)
BASOPHILS NFR BLD AUTO: 0.2 % — SIGNIFICANT CHANGE UP (ref 0–1)
BUN SERPL-MCNC: 29 MG/DL — HIGH (ref 10–20)
CALCIUM SERPL-MCNC: 8.5 MG/DL — SIGNIFICANT CHANGE UP (ref 8.5–10.1)
CHLORIDE SERPL-SCNC: 100 MMOL/L — SIGNIFICANT CHANGE UP (ref 98–110)
CK SERPL-CCNC: 82 U/L — SIGNIFICANT CHANGE UP (ref 0–225)
CO2 SERPL-SCNC: 29 MMOL/L — SIGNIFICANT CHANGE UP (ref 17–32)
CREAT SERPL-MCNC: 1 MG/DL — SIGNIFICANT CHANGE UP (ref 0.7–1.5)
CULTURE RESULTS: SIGNIFICANT CHANGE UP
CULTURE RESULTS: SIGNIFICANT CHANGE UP
EOSINOPHIL # BLD AUTO: 0.01 K/UL — SIGNIFICANT CHANGE UP (ref 0–0.7)
EOSINOPHIL NFR BLD AUTO: 0.1 % — SIGNIFICANT CHANGE UP (ref 0–8)
GLUCOSE SERPL-MCNC: 128 MG/DL — HIGH (ref 70–99)
HCT VFR BLD CALC: 41.3 % — LOW (ref 42–52)
HGB BLD-MCNC: 13.6 G/DL — LOW (ref 14–18)
IMM GRANULOCYTES NFR BLD AUTO: 0.4 % — HIGH (ref 0.1–0.3)
LYMPHOCYTES # BLD AUTO: 1.6 K/UL — SIGNIFICANT CHANGE UP (ref 1.2–3.4)
LYMPHOCYTES # BLD AUTO: 14.1 % — LOW (ref 20.5–51.1)
MCHC RBC-ENTMCNC: 28.3 PG — SIGNIFICANT CHANGE UP (ref 27–31)
MCHC RBC-ENTMCNC: 32.9 G/DL — SIGNIFICANT CHANGE UP (ref 32–37)
MCV RBC AUTO: 86 FL — SIGNIFICANT CHANGE UP (ref 80–94)
MONOCYTES # BLD AUTO: 0.63 K/UL — HIGH (ref 0.1–0.6)
MONOCYTES NFR BLD AUTO: 5.6 % — SIGNIFICANT CHANGE UP (ref 1.7–9.3)
NEUTROPHILS # BLD AUTO: 9.02 K/UL — HIGH (ref 1.4–6.5)
NEUTROPHILS NFR BLD AUTO: 79.6 % — HIGH (ref 42.2–75.2)
NRBC # BLD: 0 /100 WBCS — SIGNIFICANT CHANGE UP (ref 0–0)
PLATELET # BLD AUTO: 301 K/UL — SIGNIFICANT CHANGE UP (ref 130–400)
POTASSIUM SERPL-MCNC: 4.7 MMOL/L — SIGNIFICANT CHANGE UP (ref 3.5–5)
POTASSIUM SERPL-SCNC: 4.7 MMOL/L — SIGNIFICANT CHANGE UP (ref 3.5–5)
RBC # BLD: 4.8 M/UL — SIGNIFICANT CHANGE UP (ref 4.7–6.1)
RBC # FLD: 13.4 % — SIGNIFICANT CHANGE UP (ref 11.5–14.5)
SODIUM SERPL-SCNC: 137 MMOL/L — SIGNIFICANT CHANGE UP (ref 135–146)
SPECIMEN SOURCE: SIGNIFICANT CHANGE UP
SPECIMEN SOURCE: SIGNIFICANT CHANGE UP
TROPONIN T SERPL-MCNC: <0.01 NG/ML — SIGNIFICANT CHANGE UP
WBC # BLD: 11.32 K/UL — HIGH (ref 4.8–10.8)
WBC # FLD AUTO: 11.32 K/UL — HIGH (ref 4.8–10.8)

## 2018-04-21 RX ORDER — IPRATROPIUM/ALBUTEROL SULFATE 18-103MCG
3 AEROSOL WITH ADAPTER (GRAM) INHALATION EVERY 6 HOURS
Qty: 0 | Refills: 0 | Status: DISCONTINUED | OUTPATIENT
Start: 2018-04-21 | End: 2018-04-23

## 2018-04-21 RX ADMIN — RIVAROXABAN 20 MILLIGRAM(S): KIT at 17:18

## 2018-04-21 RX ADMIN — Medication 40 MILLIGRAM(S): at 17:18

## 2018-04-21 RX ADMIN — SIMVASTATIN 40 MILLIGRAM(S): 20 TABLET, FILM COATED ORAL at 21:15

## 2018-04-21 RX ADMIN — Medication 20 MILLIGRAM(S): at 06:09

## 2018-04-21 RX ADMIN — Medication 100 MILLIGRAM(S): at 06:09

## 2018-04-21 RX ADMIN — Medication 100 MILLIGRAM(S): at 17:18

## 2018-04-21 RX ADMIN — Medication 200 MILLIGRAM(S): at 21:15

## 2018-04-21 RX ADMIN — Medication 40 MILLIGRAM(S): at 06:09

## 2018-04-21 RX ADMIN — LOSARTAN POTASSIUM 100 MILLIGRAM(S): 100 TABLET, FILM COATED ORAL at 06:09

## 2018-04-21 RX ADMIN — SPIRONOLACTONE 25 MILLIGRAM(S): 25 TABLET, FILM COATED ORAL at 06:09

## 2018-04-21 NOTE — PROGRESS NOTE ADULT - ASSESSMENT
63 yo M with pmh of CHF on lasix/spironolactone, AF on xarelto, HTN presenting with shortness of breath and cough for 3 days.    1) SOB and cough secondary to acute bronchitis  Patient was started as outpatient on levaquin. Continue antibiotics for a total of 7 days  Give symptomatic treatment: anti-tussive  Bronchodilators and prednisone taper    2) Diastolic heart failure (chronic)  No evidence of current decompensation  continue with lasix orally  2gr Na diet  daily weights and accurate Is/Os    3) Afib  Was RVR initially upon presentation (most likely secondary to pulmonary disease)  Currently rate controlled around 80sbpm  on xarelto and metoprolol    3) HTN: stable  - c/w losartan    4) DLD  - c/w statin    DVT ppx: continue with xarelto    DISPO: from home when medically stable, full code

## 2018-04-22 LAB
ANION GAP SERPL CALC-SCNC: 12 MMOL/L — SIGNIFICANT CHANGE UP (ref 7–14)
BUN SERPL-MCNC: 35 MG/DL — HIGH (ref 10–20)
CALCIUM SERPL-MCNC: 8.6 MG/DL — SIGNIFICANT CHANGE UP (ref 8.5–10.1)
CHLORIDE SERPL-SCNC: 100 MMOL/L — SIGNIFICANT CHANGE UP (ref 98–110)
CO2 SERPL-SCNC: 29 MMOL/L — SIGNIFICANT CHANGE UP (ref 17–32)
CREAT SERPL-MCNC: 1 MG/DL — SIGNIFICANT CHANGE UP (ref 0.7–1.5)
GLUCOSE SERPL-MCNC: 140 MG/DL — HIGH (ref 70–99)
HCT VFR BLD CALC: 40.8 % — LOW (ref 42–52)
HGB BLD-MCNC: 13.4 G/DL — LOW (ref 14–18)
MCHC RBC-ENTMCNC: 28.3 PG — SIGNIFICANT CHANGE UP (ref 27–31)
MCHC RBC-ENTMCNC: 32.8 G/DL — SIGNIFICANT CHANGE UP (ref 32–37)
MCV RBC AUTO: 86.1 FL — SIGNIFICANT CHANGE UP (ref 80–94)
NRBC # BLD: 0 /100 WBCS — SIGNIFICANT CHANGE UP (ref 0–0)
PLATELET # BLD AUTO: 295 K/UL — SIGNIFICANT CHANGE UP (ref 130–400)
POTASSIUM SERPL-MCNC: 4.8 MMOL/L — SIGNIFICANT CHANGE UP (ref 3.5–5)
POTASSIUM SERPL-SCNC: 4.8 MMOL/L — SIGNIFICANT CHANGE UP (ref 3.5–5)
RBC # BLD: 4.74 M/UL — SIGNIFICANT CHANGE UP (ref 4.7–6.1)
RBC # FLD: 13.6 % — SIGNIFICANT CHANGE UP (ref 11.5–14.5)
SODIUM SERPL-SCNC: 141 MMOL/L — SIGNIFICANT CHANGE UP (ref 135–146)
WBC # BLD: 12.84 K/UL — HIGH (ref 4.8–10.8)
WBC # FLD AUTO: 12.84 K/UL — HIGH (ref 4.8–10.8)

## 2018-04-22 RX ADMIN — Medication 3 MILLILITER(S): at 20:37

## 2018-04-22 RX ADMIN — Medication 100 MILLIGRAM(S): at 05:47

## 2018-04-22 RX ADMIN — Medication 200 MILLIGRAM(S): at 11:18

## 2018-04-22 RX ADMIN — Medication 200 MILLIGRAM(S): at 21:11

## 2018-04-22 RX ADMIN — RIVAROXABAN 20 MILLIGRAM(S): KIT at 17:36

## 2018-04-22 RX ADMIN — LOSARTAN POTASSIUM 100 MILLIGRAM(S): 100 TABLET, FILM COATED ORAL at 05:47

## 2018-04-22 RX ADMIN — Medication 40 MILLIGRAM(S): at 05:47

## 2018-04-22 RX ADMIN — SIMVASTATIN 40 MILLIGRAM(S): 20 TABLET, FILM COATED ORAL at 21:10

## 2018-04-22 RX ADMIN — Medication 100 MILLIGRAM(S): at 17:37

## 2018-04-22 RX ADMIN — Medication 200 MILLIGRAM(S): at 17:36

## 2018-04-22 RX ADMIN — Medication 3 MILLILITER(S): at 08:30

## 2018-04-22 RX ADMIN — Medication 3 MILLILITER(S): at 14:36

## 2018-04-22 RX ADMIN — SPIRONOLACTONE 25 MILLIGRAM(S): 25 TABLET, FILM COATED ORAL at 05:47

## 2018-04-22 NOTE — PROGRESS NOTE ADULT - ASSESSMENT
#SOB- sec to bronchitis on levaquin and bronchodilators. had normal echo and cath in march 2017. Decrease dose of lasix to po. will continue with prednisone and bronchodilators. 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 long QT or lung issues-- could not see old admission notes. . #SOB- sec to bronchitis on levaquin and bronchodilators. had normal echo and cath in march 2017. Decrease dose of lasix to po. will continue with prednisone and bronchodilators. 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 long QT or lung issues-- could not see old admission notes. .

## 2018-04-23 ENCOUNTER — TRANSCRIPTION ENCOUNTER (OUTPATIENT)
Age: 63
End: 2018-04-23

## 2018-04-23 VITALS — RESPIRATION RATE: 18 BRPM | HEART RATE: 100 BPM | DIASTOLIC BLOOD PRESSURE: 74 MMHG | SYSTOLIC BLOOD PRESSURE: 144 MMHG

## 2018-04-23 RX ORDER — ALBUTEROL 90 UG/1
2 AEROSOL, METERED ORAL
Qty: 2 | Refills: 0 | OUTPATIENT
Start: 2018-04-23

## 2018-04-23 RX ORDER — BUDESONIDE AND FORMOTEROL FUMARATE DIHYDRATE 160; 4.5 UG/1; UG/1
2 AEROSOL RESPIRATORY (INHALATION)
Qty: 2 | Refills: 0 | OUTPATIENT
Start: 2018-04-23

## 2018-04-23 RX ADMIN — Medication 100 MILLIGRAM(S): at 06:06

## 2018-04-23 RX ADMIN — Medication 200 MILLIGRAM(S): at 11:08

## 2018-04-23 RX ADMIN — Medication 200 MILLIGRAM(S): at 06:06

## 2018-04-23 RX ADMIN — LOSARTAN POTASSIUM 100 MILLIGRAM(S): 100 TABLET, FILM COATED ORAL at 06:06

## 2018-04-23 RX ADMIN — Medication 3 MILLILITER(S): at 12:57

## 2018-04-23 RX ADMIN — Medication 40 MILLIGRAM(S): at 06:06

## 2018-04-23 RX ADMIN — Medication 3 MILLILITER(S): at 08:41

## 2018-04-23 RX ADMIN — SPIRONOLACTONE 25 MILLIGRAM(S): 25 TABLET, FILM COATED ORAL at 06:06

## 2018-04-23 NOTE — DISCHARGE NOTE ADULT - MEDICATION SUMMARY - MEDICATIONS TO TAKE
I will START or STAY ON the medications listed below when I get home from the hospital:    predniSONE 20 mg oral tablet  -- 2 tabs for 3 days then 1 tab for 3 days & then stop  -- It is very important that you take or use this exactly as directed.  Do not skip doses or discontinue unless directed by your doctor.  Obtain medical advice before taking any non-prescription drugs as some may affect the action of this medication.  Take with food or milk.    -- Indication: For sob    spironolactone 25 mg oral tablet  -- 1 tab(s) by mouth 2 times a day  -- Indication: For CHF exacerbation    losartan 100 mg oral tablet  -- 1 tab(s) by mouth once a day  -- Indication: For Htn    Xarelto 20 mg oral tablet  -- 1 tab(s) by mouth once a day (in the evening)  -- Indication: For afib    simvastatin 40 mg oral tablet  -- 1 tab(s) by mouth once a day (at bedtime)  -- Indication: For dyslipidemia    metoprolol tartrate 100 mg oral tablet  -- 1 tab(s) by mouth 2 times a day  -- Indication: For CHF exacerbation    Symbicort 160 mcg-4.5 mcg/inh inhalation aerosol  -- 2 puff(s) inhaled 2 times a day   -- Check with your doctor before becoming pregnant.  For inhalation only.  Rinse mouth thoroughly after use.    -- Indication: For sob    ProAir HFA 90 mcg/inh inhalation aerosol  -- 2 puff(s) inhaled 4 times a day, As Needed -for bronchospasm - for cough - for shortness of breath and/or wheezing   -- For inhalation only.  It is very important that you take or use this exactly as directed.  Do not skip doses or discontinue unless directed by your doctor.  Obtain medical advice before taking any non-prescription drugs as some may affect the action of this medication.  Shake well before use.    -- Indication: For sob    Lasix 40 mg oral tablet  -- 1 tab(s) by mouth once a day  -- Indication: For CHF exacerbation

## 2018-04-23 NOTE — CONSULT NOTE ADULT - SUBJECTIVE AND OBJECTIVE BOX
63 yo M with pmh of CHF on lasix/spironolactone, AF on xarelto, HTN presenting with shortness of breath for 3 days. Was in hospital 1 week ptp for pna discharged with levaquin and prednisone. then 3 days ptp: worsening shortness of breath, leg swelling, and was unable to lay down flat.  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     social hx:    Home Medications:  losartan 100 mg oral tablet: 1 tab(s) orally once a day (20 Apr 2018 11:33)  metoprolol tartrate 100 mg oral tablet: 1 tab(s) orally 2 times a day (20 Apr 2018 11:33)  simvastatin 40 mg oral tablet: 1 tab(s) orally once a day (at bedtime) (20 Apr 2018 11:33)  spironolactone 25 mg oral tablet: 1 tab(s) orally 2 times a day (20 Apr 2018 11:33)  Xarelto 20 mg oral tablet: 1 tab(s) orally once a day (in the evening) (20 Apr 2018 11:33)      MEDICATIONS  (STANDING):  ALBUTerol/ipratropium for Nebulization 3 milliLiter(s) Nebulizer every 6 hours  furosemide    Tablet 40 milliGRAM(s) Oral daily  guaiFENesin    Syrup 200 milliGRAM(s) Oral every 6 hours  levoFLOXacin  Tablet 750 milliGRAM(s) Oral every 24 hours  losartan 100 milliGRAM(s) Oral daily  metoprolol tartrate 100 milliGRAM(s) Oral two times a day  predniSONE   Tablet 40 milliGRAM(s) Oral daily  rivaroxaban 20 milliGRAM(s) Oral every 24 hours  simvastatin 40 milliGRAM(s) Oral at bedtime  spironolactone 25 milliGRAM(s) Oral daily    MEDICATIONS  (PRN):  ALBUTerol    90 MICROgram(s) HFA Inhaler 2 Puff(s) Inhalation every 6 hours PRN Bronchospasm      T(C): 36.3 (04-23-18 @ 05:42), Max: 36.7 (04-22-18 @ 20:29)  HR: 88 (04-23-18 @ 05:42) (86 - 113)  BP: 133/91 (04-23-18 @ 05:42) (123/65 - 133/91)  RR: 18 (04-23-18 @ 05:42) (18 - 18)  SpO2: 92% (04-23-18 @ 08:06) (92% - 94%) room air    PHYSICAL EXAM:    GENERAL: NAD, well-developed  HEAD:  Atraumatic, Normocephalic  EYES: EOMI, PERRLA, conjunctiva and sclera clear  NECK: Supple, No JVD  CHEST/LUNG: Clear to auscultation bilaterally; No wheeze  HEART: Regular rate and rhythm; No murmurs, rubs, or gallops  ABDOMEN: Soft, Nontender, Nondistended; Bowel sounds present  EXTREMITIES:  2+ Peripheral Pulses, No clubbing, cyanosis, or edema  PSYCH: AAOx3  NEUROLOGY: non-focal  SKIN: No rashes or lesions    labs:             13.4   12.84 )-----------( 295      ( 22 Apr 2018 04:27 )             40.8   Auto Eosinophil %: 0.1 % (04.21.18 @ 07:07)      04-22    141  |  100  |  35<H>  ----------------------------<  140<H>  4.8   |  29  |  1.0    Ca    8.6      22 Apr 2018 04:27    Serum Pro-Brain Natriuretic Peptide: 298 pg/mL (04.20.18 @ 10:52)      Culture Results:   No growth at 5 days. (04-16 @ 03:11)  Culture Results:   No growth at 5 days. (04-16 @ 03:11)     Xray Chest 1 View-PORTABLE IMMEDIATE (04.20.18 @ 10:47) >  No radiographic evidence of acute cardiopulmonary disease.    < from: [facility] Duplex Lower Ext Vein Scan, Bilat (04.21.18 @ 14:09) >  No evidence of deep venous thrombosis or superficial thrombophlebitis in   bilateral lower extremities.      < end of copied text >

## 2018-04-23 NOTE — DISCHARGE NOTE ADULT - HOSPITAL COURSE
61 yo M with pmh of CHF on lasix/spironolactone, AF on xarelto, HTN presenting with shortness of breath and cough for 3 days.; during hospitalization, pt was seen by pulm; was recommendded to get hrct & pft OP  - pt advised to f/u with pmd, cardio & pulm

## 2018-04-23 NOTE — DISCHARGE NOTE ADULT - CARE PLAN
Principal Discharge DX:	Bronchitis  Goal:	medical management; OP HRCT & PFTs  Assessment and plan of treatment:	f/u with pmd & pulm

## 2018-04-23 NOTE — DISCHARGE NOTE ADULT - MEDICATION SUMMARY - MEDICATIONS TO STOP TAKING
I will STOP taking the medications listed below when I get home from the hospital:    Levaquin 750 mg oral tablet  -- 1 tab(s) by mouth once a day x 5 days   -- Avoid prolonged or excessive exposure to direct and/or artificial sunlight while taking this medication.  Do not take dairy products, antacids, or iron preparations within one hour of this medication.  Finish all this medication unless otherwise directed by prescriber.  May cause drowsiness or dizziness.  Medication should be taken with plenty of water.

## 2018-04-23 NOTE — PROGRESS NOTE ADULT - SUBJECTIVE AND OBJECTIVE BOX
SUBJECTIVE:    Patient is a 62y old Male who presents with a chief complaint of worsening SOB and cough    Currently admitted to medicine with the primary diagnosis of acute bronchitis     Today is hospital day 1d. Patient said he's doing much better, no chest pain, SOB has much improved and cough has decreased. He is able to ambulate without oxygen with no SOB     PAST MEDICAL & SURGICAL HISTORY  Pulmonary edema cardiac cause  Atrial fibrillation  HTN (hypertension)  H/O prior ablation treatment    SOCIAL HISTORY:  Negative for smoking/alcohol/drug use.     ALLERGIES:  No Known Allergies    MEDICATIONS:  STANDING MEDICATIONS  furosemide    Tablet 40 milliGRAM(s) Oral daily  levoFLOXacin  Tablet 750 milliGRAM(s) Oral every 24 hours  losartan 100 milliGRAM(s) Oral daily  metoprolol tartrate 100 milliGRAM(s) Oral two times a day  predniSONE   Tablet 40 milliGRAM(s) Oral daily  rivaroxaban 20 milliGRAM(s) Oral every 24 hours  simvastatin 40 milliGRAM(s) Oral at bedtime  spironolactone 25 milliGRAM(s) Oral daily    PRN MEDICATIONS  ALBUTerol    90 MICROgram(s) HFA Inhaler 2 Puff(s) Inhalation every 6 hours PRN    VITALS:   T(F): 98.1  HR: 97  BP: 146/89  RR: 18  SpO2: 96%    LABS:                        13.6   11.32 )-----------( 301      ( 21 Apr 2018 07:07 )             41.3     04-21    137  |  100  |  29<H>  ----------------------------<  128<H>  4.7   |  29  |  1.0    Ca    8.5      21 Apr 2018 07:07    TPro  7.2  /  Alb  4.2  /  TBili  0.6  /  DBili  x   /  AST  24  /  ALT  21  /  AlkPhos  66  04-20    PT/INR - ( 20 Apr 2018 10:52 )   PT: 27.90 sec;   INR: 2.53 ratio         PTT - ( 20 Apr 2018 10:52 )  PTT:31.1 sec      Creatine Kinase, Serum: 82 U/L (04-21-18 @ 07:07)  Troponin T, Serum: <0.01 ng/mL (04-21-18 @ 07:07)  Creatine Kinase, Serum: 102 U/L (04-20-18 @ 22:23)  Troponin T, Serum: <0.01 ng/mL (04-20-18 @ 22:23)      CARDIAC MARKERS ( 21 Apr 2018 07:07 )  x     / <0.01 ng/mL / 82 U/L / x     / x      CARDIAC MARKERS ( 20 Apr 2018 22:23 )  x     / <0.01 ng/mL / 102 U/L / x     / x      CARDIAC MARKERS ( 20 Apr 2018 10:52 )  x     / <0.01 ng/mL / x     / x     / x          RADIOLOGY:    CXR: No evidence of cardiopulmonary disease    Duplex venous lower extremities: No DVT    PHYSICAL EXAM:  GEN: No acute distress  LUNGS: Bilateral diffuse coarse ronchi   HEART: Irregular S1S2  ABD: Soft, non-tender, non-distended. Bowel sounds present  EXT: No edema  NEURO: AAOX3
SUBJECTIVE:    Patient is a 62y old Male who presents with a chief complaint of   Currently admitted to medicine with the primary diagnosis of CHF exacerbation     Today is hospital day 2d. This morning he is resting comfortably in bed and reports no new issues or overnight events.     PAST MEDICAL & SURGICAL HISTORY  Pulmonary edema cardiac cause  Atrial fibrillation  HTN (hypertension)  H/O prior ablation treatment    SOCIAL HISTORY:  Negative for smoking/alcohol/drug use.     ALLERGIES:  No Known Allergies    MEDICATIONS:  STANDING MEDICATIONS  ALBUTerol/ipratropium for Nebulization 3 milliLiter(s) Nebulizer every 6 hours  furosemide    Tablet 40 milliGRAM(s) Oral daily  guaiFENesin    Syrup 200 milliGRAM(s) Oral every 6 hours  levoFLOXacin  Tablet 750 milliGRAM(s) Oral every 24 hours  losartan 100 milliGRAM(s) Oral daily  metoprolol tartrate 100 milliGRAM(s) Oral two times a day  predniSONE   Tablet 40 milliGRAM(s) Oral daily  rivaroxaban 20 milliGRAM(s) Oral every 24 hours  simvastatin 40 milliGRAM(s) Oral at bedtime  spironolactone 25 milliGRAM(s) Oral daily    PRN MEDICATIONS  ALBUTerol    90 MICROgram(s) HFA Inhaler 2 Puff(s) Inhalation every 6 hours PRN    VITALS:   T(F): 96.5  HR: 94  BP: 164/95  RR: 18  SpO2: 97%    LABS:                        13.4   12.84 )-----------( 295      ( 22 Apr 2018 04:27 )             40.8     04-22    141  |  100  |  35<H>  ----------------------------<  140<H>  4.8   |  29  |  1.0    Ca    8.6      22 Apr 2018 04:27    TPro  7.2  /  Alb  4.2  /  TBili  0.6  /  DBili  x   /  AST  24  /  ALT  21  /  AlkPhos  66  04-20    PT/INR - ( 20 Apr 2018 10:52 )   PT: 27.90 sec;   INR: 2.53 ratio         PTT - ( 20 Apr 2018 10:52 )  PTT:31.1 sec          CARDIAC MARKERS ( 21 Apr 2018 07:07 )  x     / <0.01 ng/mL / 82 U/L / x     / x      CARDIAC MARKERS ( 20 Apr 2018 22:23 )  x     / <0.01 ng/mL / 102 U/L / x     / x      CARDIAC MARKERS ( 20 Apr 2018 10:52 )  x     / <0.01 ng/mL / x     / x     / x          RADIOLOGY:    PHYSICAL EXAM:  GEN: No acute distress  LUNGS: Clear to auscultation bilaterally   HEART: S1/S2 present. RRR.   ABD: Soft, non-tender, non-distended. Bowel sounds present  EXT: NC/NC/NE/2+PP/CLARK  NEURO: AAOX3
SUBJECTIVE:    Patient is a 62y old Male who presents with a chief complaint of   Currently admitted to medicine with the primary diagnosis of CHF exacerbation     Today is hospital day 3d. This morning he is resting comfortably in bed and reports no new issues or overnight events.     PAST MEDICAL & SURGICAL HISTORY  Pulmonary edema cardiac cause  Atrial fibrillation  HTN (hypertension)  H/O prior ablation treatment    SOCIAL HISTORY:  Negative for smoking/alcohol/drug use.     ALLERGIES:  No Known Allergies    MEDICATIONS:  STANDING MEDICATIONS  ALBUTerol/ipratropium for Nebulization 3 milliLiter(s) Nebulizer every 6 hours  furosemide    Tablet 40 milliGRAM(s) Oral daily  guaiFENesin    Syrup 200 milliGRAM(s) Oral every 6 hours  levoFLOXacin  Tablet 750 milliGRAM(s) Oral every 24 hours  losartan 100 milliGRAM(s) Oral daily  metoprolol tartrate 100 milliGRAM(s) Oral two times a day  predniSONE   Tablet 40 milliGRAM(s) Oral daily  rivaroxaban 20 milliGRAM(s) Oral every 24 hours  simvastatin 40 milliGRAM(s) Oral at bedtime  spironolactone 25 milliGRAM(s) Oral daily    PRN MEDICATIONS  ALBUTerol    90 MICROgram(s) HFA Inhaler 2 Puff(s) Inhalation every 6 hours PRN    VITALS:   T(F): 97.3  HR: 88  BP: 133/91  RR: 18  SpO2: 92%    LABS:                        13.4   12.84 )-----------( 295      ( 22 Apr 2018 04:27 )             40.8     04-22    141  |  100  |  35<H>  ----------------------------<  140<H>  4.8   |  29  |  1.0    Ca    8.6      22 Apr 2018 04:27      RADIOLOGY:  < from: Xray Chest 1 View-PORTABLE IMMEDIATE (04.20.18 @ 10:47) >    No radiographic evidence of acute cardiopulmonary disease.    < end of copied text >    PHYSICAL EXAM:  GEN: No acute distress  LUNGS: Clear to auscultation bilaterally   HEART: S1/S2 present. RRR.   ABD: Soft, non-tender, non-distended. Bowel sounds present  EXT: no edema  NEURO: AAOX3
SUBJECTIVE:    Patient is a 62y old Male who presents with a chief complaint of bronchitis (23 Apr 2018 10:23)    Currently admitted to medicine with the primary diagnosis of Bronchitis     Today is hospital day 3d. This morning he is resting comfortably in bed and reports no new issues or overnight events.     PAST MEDICAL & SURGICAL HISTORY  Pulmonary edema cardiac cause  Atrial fibrillation  HTN (hypertension)  H/O prior ablation treatment    SOCIAL HISTORY:  Negative for smoking/alcohol/drug use.     ALLERGIES:  No Known Allergies    MEDICATIONS:  STANDING MEDICATIONS  ALBUTerol/ipratropium for Nebulization 3 milliLiter(s) Nebulizer every 6 hours  furosemide    Tablet 40 milliGRAM(s) Oral daily  guaiFENesin    Syrup 200 milliGRAM(s) Oral every 6 hours  levoFLOXacin  Tablet 750 milliGRAM(s) Oral every 24 hours  losartan 100 milliGRAM(s) Oral daily  metoprolol tartrate 100 milliGRAM(s) Oral two times a day  predniSONE   Tablet 40 milliGRAM(s) Oral daily  rivaroxaban 20 milliGRAM(s) Oral every 24 hours  simvastatin 40 milliGRAM(s) Oral at bedtime  spironolactone 25 milliGRAM(s) Oral daily    PRN MEDICATIONS  ALBUTerol    90 MICROgram(s) HFA Inhaler 2 Puff(s) Inhalation every 6 hours PRN    VITALS:   T(F): 97.3  HR: 88  BP: 133/91  RR: 18  SpO2: 92%    LABS:                        13.4   12.84 )-----------( 295      ( 22 Apr 2018 04:27 )             40.8     04-22    141  |  100  |  35<H>  ----------------------------<  140<H>  4.8   |  29  |  1.0    Ca    8.6      22 Apr 2018 04:27                    RADIOLOGY:    PHYSICAL EXAM:  GEN: No acute distress  LUNGS: Clear to auscultation bilaterally   HEART: S1/S2 present. RRR.   ABD: Soft, non-tender, non-distended. Bowel sounds present  EXT: No edema  NEURO: AAOX3

## 2018-04-23 NOTE — DISCHARGE NOTE ADULT - PATIENT PORTAL LINK FT
You can access the [facility] Patient Portal, offered by [facility], by registering with the following website: [email or web address]

## 2018-04-23 NOTE — PROGRESS NOTE ADULT - ASSESSMENT
#SOB- sec to bronchitis on levaquin and bronchodilators. Had normal echo and cath in march 2017. Decrease dose of lasix to po. will continue with prednisone and bronchodilators. Echo to r/o pulm HTN and needs CT noncontrast as got exposed to ? 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   He is having episodes of SOB - recurrent bronchitis, -- venous duplex was negative  patient was seen by pulmonary and they do not think that  a ct scan is required now.      # a fib on xarelto rate is controlled-- as per discussion with wife, he was on rythmol and amiodarone which were both stopped? long QT or lung issues-- could not see old admission notes.       Dc home today as cleared by pulmonary ---spent more than 30mins

## 2018-04-23 NOTE — DISCHARGE NOTE ADULT - CARE PROVIDER_API CALL
Carl Garzon), Critical Care Medicine; Internal Medicine; Pulmonary Disease; Sleep Medicine  [address] [place] [address]  Phone: [phone]  Fax: [phone]    Hesham Rangel), Family Medicine  [address] [place] [address]  Phone: [phone]  Fax: [phone]

## 2018-04-23 NOTE — CONSULT NOTE ADULT - ASSESSMENT
68 y old man w/ hx of chf, a fib, presenting w/ sob, recent ? pna 1 week ptp. worked as sandblaster for 30 years.    -SOB:  get HRCT chest, ill need pft's on outside basis  Unlikely silicosis since chest x ray doesn't meet typical silicosis pattern of nodular and patchy consolidation 68 y old man w/ hx of chf, a fib, presenting w/ sob, recent ? pna 1 week ptp. worked as sandblaster for 30 years, wheezing feels better with prednisone    - taper prednisone and dc  - symbicort 2 puffs q 12h  - op pft/ chest ct

## 2018-04-23 NOTE — DISCHARGE NOTE ADULT - MEDICATION SUMMARY - MEDICATIONS TO CHANGE
I will SWITCH the dose or number of times a day I take the medications listed below when I get home from the hospital:    albuterol 0.63 mg/3 mL (0.021%) inhalation solution  -- 3 milliliter(s) by nebulizer every 4 hours x 7 days   -- For inhalation only.  It is very important that you take or use this exactly as directed.  Do not skip doses or discontinue unless directed by your doctor.  Obtain medical advice before taking any non-prescription drugs as some may affect the action of this medication.

## 2018-04-27 DIAGNOSIS — J20.9 ACUTE BRONCHITIS, UNSPECIFIED: ICD-10-CM

## 2018-04-27 DIAGNOSIS — E78.5 HYPERLIPIDEMIA, UNSPECIFIED: ICD-10-CM

## 2018-04-27 DIAGNOSIS — I48.91 UNSPECIFIED ATRIAL FIBRILLATION: ICD-10-CM

## 2018-04-27 DIAGNOSIS — I11.0 HYPERTENSIVE HEART DISEASE WITH HEART FAILURE: ICD-10-CM

## 2018-04-27 DIAGNOSIS — I50.32 CHRONIC DIASTOLIC (CONGESTIVE) HEART FAILURE: ICD-10-CM

## 2018-04-27 DIAGNOSIS — R06.02 SHORTNESS OF BREATH: ICD-10-CM

## 2018-05-07 NOTE — DISCHARGE NOTE ADULT - NSCORESITESY/N_GEN_A_CORE_RD
Discussed with provider, Siomara Alvarez CNM- pt has option to leave urine for eval UTI, but sx are more in line with a potential kidney stone. She states she does not want to go to ER- she states she will come tomorrow to leave a urine sample. She states if pain gets worse, or if bleeding/cramping pelvic pain, she will go to ER. She states she will try tylenol and rest. Pt has no new concerns since earlier conversation. Again reports the pain is right sided, flank area- into bad and comes/goes.    No

## 2018-06-04 ENCOUNTER — APPOINTMENT (OUTPATIENT)
Dept: CARDIOLOGY | Facility: CLINIC | Age: 63
End: 2018-06-04

## 2018-06-04 VITALS
SYSTOLIC BLOOD PRESSURE: 174 MMHG | WEIGHT: 251 LBS | DIASTOLIC BLOOD PRESSURE: 96 MMHG | HEART RATE: 104 BPM | BODY MASS INDEX: 35.01 KG/M2 | OXYGEN SATURATION: 99 %

## 2018-06-05 PROBLEM — I50.1 LEFT VENTRICULAR FAILURE, UNSPECIFIED: Chronic | Status: ACTIVE | Noted: 2018-04-20

## 2018-06-05 RX ORDER — BUDESONIDE AND FORMOTEROL FUMARATE DIHYDRATE 160; 4.5 UG/1; UG/1
160-4.5 AEROSOL RESPIRATORY (INHALATION)
Qty: 204 | Refills: 0 | Status: ACTIVE | COMMUNITY
Start: 2018-04-23

## 2018-06-05 RX ORDER — ALBUTEROL SULFATE 90 UG/1
108 (90 BASE) AEROSOL, METERED RESPIRATORY (INHALATION)
Qty: 85 | Refills: 0 | Status: ACTIVE | COMMUNITY
Start: 2018-02-05

## 2018-06-05 RX ORDER — ACETAMINOPHEN 325 MG/1
325 TABLET ORAL
Refills: 0 | Status: ACTIVE | COMMUNITY

## 2018-06-21 ENCOUNTER — APPOINTMENT (OUTPATIENT)
Dept: CARDIOLOGY | Facility: CLINIC | Age: 63
End: 2018-06-21

## 2018-07-09 ENCOUNTER — OUTPATIENT (OUTPATIENT)
Dept: OUTPATIENT SERVICES | Facility: HOSPITAL | Age: 63
LOS: 1 days | Discharge: HOME | End: 2018-07-09

## 2018-07-09 VITALS
RESPIRATION RATE: 15 BRPM | OXYGEN SATURATION: 99 % | WEIGHT: 257.94 LBS | DIASTOLIC BLOOD PRESSURE: 99 MMHG | TEMPERATURE: 99 F | HEART RATE: 100 BPM | HEIGHT: 69 IN | SYSTOLIC BLOOD PRESSURE: 183 MMHG

## 2018-07-09 DIAGNOSIS — I48.92 UNSPECIFIED ATRIAL FLUTTER: ICD-10-CM

## 2018-07-09 DIAGNOSIS — Z01.818 ENCOUNTER FOR OTHER PREPROCEDURAL EXAMINATION: ICD-10-CM

## 2018-07-09 DIAGNOSIS — I48.0 PAROXYSMAL ATRIAL FIBRILLATION: ICD-10-CM

## 2018-07-09 DIAGNOSIS — Z98.890 OTHER SPECIFIED POSTPROCEDURAL STATES: Chronic | ICD-10-CM

## 2018-07-09 DIAGNOSIS — Z96.649 PRESENCE OF UNSPECIFIED ARTIFICIAL HIP JOINT: Chronic | ICD-10-CM

## 2018-07-09 LAB
ALBUMIN SERPL ELPH-MCNC: 4.1 G/DL — SIGNIFICANT CHANGE UP (ref 3.5–5.2)
ALP SERPL-CCNC: 80 U/L — SIGNIFICANT CHANGE UP (ref 30–115)
ALT FLD-CCNC: 17 U/L — SIGNIFICANT CHANGE UP (ref 0–41)
ANION GAP SERPL CALC-SCNC: 15 MMOL/L — HIGH (ref 7–14)
APTT BLD: 45.2 SEC — HIGH (ref 27–39.2)
AST SERPL-CCNC: 21 U/L — SIGNIFICANT CHANGE UP (ref 0–41)
BILIRUB SERPL-MCNC: 0.6 MG/DL — SIGNIFICANT CHANGE UP (ref 0.2–1.2)
BUN SERPL-MCNC: 18 MG/DL — SIGNIFICANT CHANGE UP (ref 10–20)
CALCIUM SERPL-MCNC: 9.3 MG/DL — SIGNIFICANT CHANGE UP (ref 8.5–10.1)
CHLORIDE SERPL-SCNC: 97 MMOL/L — LOW (ref 98–110)
CO2 SERPL-SCNC: 26 MMOL/L — SIGNIFICANT CHANGE UP (ref 17–32)
CREAT SERPL-MCNC: 0.9 MG/DL — SIGNIFICANT CHANGE UP (ref 0.7–1.5)
GLUCOSE SERPL-MCNC: 102 MG/DL — HIGH (ref 70–99)
HCT VFR BLD CALC: 38.8 % — LOW (ref 42–52)
HGB BLD-MCNC: 12.8 G/DL — LOW (ref 14–18)
INR BLD: 1.8 RATIO — HIGH (ref 0.65–1.3)
MCHC RBC-ENTMCNC: 28.6 PG — SIGNIFICANT CHANGE UP (ref 27–31)
MCHC RBC-ENTMCNC: 33 G/DL — SIGNIFICANT CHANGE UP (ref 32–37)
MCV RBC AUTO: 86.6 FL — SIGNIFICANT CHANGE UP (ref 80–94)
NRBC # BLD: 0 /100 WBCS — SIGNIFICANT CHANGE UP (ref 0–0)
PLATELET # BLD AUTO: 263 K/UL — SIGNIFICANT CHANGE UP (ref 130–400)
POTASSIUM SERPL-MCNC: 4.7 MMOL/L — SIGNIFICANT CHANGE UP (ref 3.5–5)
POTASSIUM SERPL-SCNC: 4.7 MMOL/L — SIGNIFICANT CHANGE UP (ref 3.5–5)
PROT SERPL-MCNC: 7.3 G/DL — SIGNIFICANT CHANGE UP (ref 6–8)
PROTHROM AB SERPL-ACNC: 19.3 SEC — HIGH (ref 9.95–12.87)
RBC # BLD: 4.48 M/UL — LOW (ref 4.7–6.1)
RBC # FLD: 13.4 % — SIGNIFICANT CHANGE UP (ref 11.5–14.5)
SODIUM SERPL-SCNC: 138 MMOL/L — SIGNIFICANT CHANGE UP (ref 135–146)
WBC # BLD: 9.01 K/UL — SIGNIFICANT CHANGE UP (ref 4.8–10.8)
WBC # FLD AUTO: 9.01 K/UL — SIGNIFICANT CHANGE UP (ref 4.8–10.8)

## 2018-07-09 RX ORDER — SPIRONOLACTONE 25 MG/1
1 TABLET, FILM COATED ORAL
Qty: 0 | Refills: 0 | COMMUNITY

## 2018-07-09 RX ORDER — RIVAROXABAN 15 MG-20MG
1 KIT ORAL
Qty: 0 | Refills: 0 | COMMUNITY

## 2018-07-09 NOTE — H&P PST ADULT - FAMILY HISTORY
Father  Still living? Unknown  CAD (coronary artery disease), Age at diagnosis: Age Unknown     Sibling  Still living? Unknown  Family history of cancer, Age at diagnosis: Age Unknown

## 2018-07-09 NOTE — H&P PST ADULT - HISTORY OF PRESENT ILLNESS
63 year old male here for long hx of a fib now for mapping eps study and ablation  fos= 3  denies chest pain sob palp  denies recent uri or uti  PT DENEIS ANY RASHES, ABRASION, OR OPEN WOUNDS OR CUTS

## 2018-07-19 ENCOUNTER — INPATIENT (INPATIENT)
Facility: HOSPITAL | Age: 63
LOS: 0 days | Discharge: HOME | End: 2018-07-20
Attending: INTERNAL MEDICINE | Admitting: INTERNAL MEDICINE

## 2018-07-19 VITALS
HEART RATE: 83 BPM | SYSTOLIC BLOOD PRESSURE: 156 MMHG | TEMPERATURE: 98 F | OXYGEN SATURATION: 99 % | DIASTOLIC BLOOD PRESSURE: 98 MMHG | WEIGHT: 257.94 LBS | HEIGHT: 69 IN | RESPIRATION RATE: 15 BRPM

## 2018-07-19 DIAGNOSIS — I48.0 PAROXYSMAL ATRIAL FIBRILLATION: ICD-10-CM

## 2018-07-19 DIAGNOSIS — I48.92 UNSPECIFIED ATRIAL FLUTTER: ICD-10-CM

## 2018-07-19 DIAGNOSIS — Z96.649 PRESENCE OF UNSPECIFIED ARTIFICIAL HIP JOINT: Chronic | ICD-10-CM

## 2018-07-19 DIAGNOSIS — Z98.890 OTHER SPECIFIED POSTPROCEDURAL STATES: Chronic | ICD-10-CM

## 2018-07-19 LAB
HCT VFR BLD CALC: 33.5 % — LOW (ref 42–52)
HGB BLD-MCNC: 11 G/DL — LOW (ref 14–18)
MCHC RBC-ENTMCNC: 28.3 PG — SIGNIFICANT CHANGE UP (ref 27–31)
MCHC RBC-ENTMCNC: 32.8 G/DL — SIGNIFICANT CHANGE UP (ref 32–37)
MCV RBC AUTO: 86.1 FL — SIGNIFICANT CHANGE UP (ref 80–94)
NRBC # BLD: 0 /100 WBCS — SIGNIFICANT CHANGE UP (ref 0–0)
PLATELET # BLD AUTO: 221 K/UL — SIGNIFICANT CHANGE UP (ref 130–400)
RBC # BLD: 3.89 M/UL — LOW (ref 4.7–6.1)
RBC # FLD: 13.9 % — SIGNIFICANT CHANGE UP (ref 11.5–14.5)
WBC # BLD: 11.66 K/UL — HIGH (ref 4.8–10.8)
WBC # FLD AUTO: 11.66 K/UL — HIGH (ref 4.8–10.8)

## 2018-07-19 RX ORDER — PANTOPRAZOLE SODIUM 20 MG/1
40 TABLET, DELAYED RELEASE ORAL
Qty: 0 | Refills: 0 | Status: DISCONTINUED | OUTPATIENT
Start: 2018-07-19 | End: 2018-07-20

## 2018-07-19 RX ORDER — RIVAROXABAN 15 MG-20MG
1 KIT ORAL
Qty: 0 | Refills: 0 | COMMUNITY

## 2018-07-19 RX ORDER — METOPROLOL TARTRATE 50 MG
100 TABLET ORAL
Qty: 0 | Refills: 0 | Status: DISCONTINUED | OUTPATIENT
Start: 2018-07-19 | End: 2018-07-20

## 2018-07-19 RX ORDER — MORPHINE SULFATE 50 MG/1
2 CAPSULE, EXTENDED RELEASE ORAL
Qty: 0 | Refills: 0 | Status: DISCONTINUED | OUTPATIENT
Start: 2018-07-19 | End: 2018-07-20

## 2018-07-19 RX ORDER — ALBUTEROL 90 UG/1
2 AEROSOL, METERED ORAL EVERY 6 HOURS
Qty: 0 | Refills: 0 | Status: DISCONTINUED | OUTPATIENT
Start: 2018-07-19 | End: 2018-07-20

## 2018-07-19 RX ORDER — ONDANSETRON 8 MG/1
4 TABLET, FILM COATED ORAL ONCE
Qty: 0 | Refills: 0 | Status: DISCONTINUED | OUTPATIENT
Start: 2018-07-19 | End: 2018-07-20

## 2018-07-19 RX ORDER — FUROSEMIDE 40 MG
40 TABLET ORAL DAILY
Qty: 0 | Refills: 0 | Status: DISCONTINUED | OUTPATIENT
Start: 2018-07-19 | End: 2018-07-20

## 2018-07-19 RX ORDER — SIMVASTATIN 20 MG/1
1 TABLET, FILM COATED ORAL
Qty: 0 | Refills: 0 | COMMUNITY

## 2018-07-19 RX ORDER — SPIRONOLACTONE 25 MG/1
1 TABLET, FILM COATED ORAL
Qty: 0 | Refills: 0 | COMMUNITY

## 2018-07-19 RX ORDER — BUDESONIDE AND FORMOTEROL FUMARATE DIHYDRATE 160; 4.5 UG/1; UG/1
2 AEROSOL RESPIRATORY (INHALATION)
Qty: 0 | Refills: 0 | Status: DISCONTINUED | OUTPATIENT
Start: 2018-07-19 | End: 2018-07-20

## 2018-07-19 RX ORDER — SODIUM CHLORIDE 9 MG/ML
1000 INJECTION INTRAMUSCULAR; INTRAVENOUS; SUBCUTANEOUS
Qty: 0 | Refills: 0 | Status: DISCONTINUED | OUTPATIENT
Start: 2018-07-19 | End: 2018-07-19

## 2018-07-19 RX ORDER — SIMVASTATIN 20 MG/1
40 TABLET, FILM COATED ORAL AT BEDTIME
Qty: 0 | Refills: 0 | Status: DISCONTINUED | OUTPATIENT
Start: 2018-07-19 | End: 2018-07-20

## 2018-07-19 RX ORDER — SPIRONOLACTONE 25 MG/1
25 TABLET, FILM COATED ORAL DAILY
Qty: 0 | Refills: 0 | Status: DISCONTINUED | OUTPATIENT
Start: 2018-07-19 | End: 2018-07-20

## 2018-07-19 RX ORDER — LOSARTAN POTASSIUM 100 MG/1
100 TABLET, FILM COATED ORAL DAILY
Qty: 0 | Refills: 0 | Status: DISCONTINUED | OUTPATIENT
Start: 2018-07-19 | End: 2018-07-20

## 2018-07-19 RX ORDER — FUROSEMIDE 40 MG
1 TABLET ORAL
Qty: 0 | Refills: 0 | COMMUNITY

## 2018-07-19 RX ORDER — RIVAROXABAN 15 MG-20MG
20 KIT ORAL EVERY 24 HOURS
Qty: 0 | Refills: 0 | Status: DISCONTINUED | OUTPATIENT
Start: 2018-07-19 | End: 2018-07-20

## 2018-07-19 RX ORDER — METOPROLOL TARTRATE 50 MG
1 TABLET ORAL
Qty: 0 | Refills: 0 | COMMUNITY

## 2018-07-19 RX ORDER — PANTOPRAZOLE SODIUM 20 MG/1
40 TABLET, DELAYED RELEASE ORAL ONCE
Qty: 0 | Refills: 0 | Status: COMPLETED | OUTPATIENT
Start: 2018-07-19 | End: 2018-07-19

## 2018-07-19 RX ORDER — LOSARTAN POTASSIUM 100 MG/1
1 TABLET, FILM COATED ORAL
Qty: 0 | Refills: 0 | COMMUNITY

## 2018-07-19 RX ADMIN — SIMVASTATIN 40 MILLIGRAM(S): 20 TABLET, FILM COATED ORAL at 21:35

## 2018-07-19 RX ADMIN — PANTOPRAZOLE SODIUM 40 MILLIGRAM(S): 20 TABLET, DELAYED RELEASE ORAL at 19:47

## 2018-07-19 RX ADMIN — BUDESONIDE AND FORMOTEROL FUMARATE DIHYDRATE 2 PUFF(S): 160; 4.5 AEROSOL RESPIRATORY (INHALATION) at 21:33

## 2018-07-19 RX ADMIN — Medication 100 MILLIGRAM(S): at 19:46

## 2018-07-19 RX ADMIN — RIVAROXABAN 20 MILLIGRAM(S): KIT at 21:34

## 2018-07-19 NOTE — H&P ADULT - NSHPPHYSICALEXAM_GEN_ALL_CORE
Obese  Resting comfortably in Bed  RRR  Normal S1S2  GBBS  soft non tender abdomen  Clean Right femoral area. No active bleeding  +2 dorsalis pedis pulses bilaterally

## 2018-07-19 NOTE — H&P ADULT - HISTORY OF PRESENT ILLNESS
62 yo M PMH of AFib/flutter on Xarelto, HTN, Obesity, Asthma, DLD, CHF? presenting for elective A fib/flutter ablation. Successful Ablation performed by Dr Brito. Patient Admitted to CCU for Monitoring and for urology evaluation due to hematuria after Galvan placement.

## 2018-07-19 NOTE — PRE-ANESTHESIA EVALUATION ADULT - NSANTHOSAYNRD_GEN_A_CORE
No. CORNELIO screening performed.  STOP BANG Legend: 0-2 = LOW Risk; 3-4 = INTERMEDIATE Risk; 5-8 = HIGH Risk

## 2018-07-19 NOTE — CHART NOTE - NSCHARTNOTEFT_GEN_A_CORE
Electrophysiology Brief Post-Op Note      I have personally seen and examined the patient.  I agree with the history and physical which I have reviewed and noted any changes below.  07-19-18 @ 07:46    PRE-OP DIAGNOSIS: persistent atrial fibrillation    POST-OP DIAGNOSIS: persistent atrial fibrillation    PROCEDURE:  -EP study  -3D mapping  -Pulmonary veins isolation  -Ablation of CTI isthmus of atrial flutter  -DC cardioversion    Vascular Access used (using Ultrasound Guidance)  -Right Femoral Vein: 8F  8F  -Left Femoral Vein: 11F 8F  -Right Femoral Artery: none    All sheaths and wires removed and manual pressure applied.    Physician: Daryl  Assistant: None    ANESTHESIA TYPE:  [X]General Anesthesia  [  ] Sedation  [  ] Local/Regional    ESTIMATED BLOOD LOSS:  20 mL    CONDITION  [  ] Critical  [  ] Serious  [  ]Fair  [X]Good      SPECIMENS REMOVED (IF APPLICABLE): NONE      IMPLANTS (IF APPLICABLE): NONE      FINDINGS  -Successful ablation of Atrial Fibrillation and atrial flutter  -Hematuria secondary to Galvan    PLAN OF CARE  -             Admission to CCU due to prolonged general anesthesia and hematuria secondary to Galvan                 (needs close monitoring as Anticagulation is necessary)  -	Start Xarelto 20 mg qd tonight at 10 pm  -	Start protonix 40 mg daily tonight  -	Bed rest for 4 hours  -	Recommend  consult

## 2018-07-19 NOTE — H&P ADULT - ASSESSMENT
Assessment:  ·	A fib/Flutter s/p ablation  ·	Hematuria Traumatic post lazcano Insertion  ·	HTN  ·	DLD  ·	Asthma  ·	HFpEF  ·	Obesity    Plan:  ·	CCU Monitoring/ Telemetry  ·	Groin exam at midnight/ Bed rest 4 hours  ·	Restart Xarelto  ·	Keep lazcano Urology eval as per Attending request  ·	Inahlers PRN  ·	Keep Home meds: Metoporolol, Losartan, Spironolactone, Simvastatin  ·	Outpatient Pulmonary eval for CORNELIO as Stop Bang>3  ·	DVT ppx Xarelto. PPI ppx Protonix  ·	OOB to chair in 4 hours  ·	Dispo Home Tomorrow if Hematuria clears and voiding trial successful

## 2018-07-20 ENCOUNTER — TRANSCRIPTION ENCOUNTER (OUTPATIENT)
Age: 63
End: 2018-07-20

## 2018-07-20 VITALS
DIASTOLIC BLOOD PRESSURE: 7 MMHG | SYSTOLIC BLOOD PRESSURE: 141 MMHG | OXYGEN SATURATION: 99 % | HEART RATE: 63 BPM | RESPIRATION RATE: 24 BRPM

## 2018-07-20 LAB
ALBUMIN SERPL ELPH-MCNC: 4.2 G/DL — SIGNIFICANT CHANGE UP (ref 3.5–5.2)
ALP SERPL-CCNC: 57 U/L — SIGNIFICANT CHANGE UP (ref 30–115)
ALT FLD-CCNC: 11 U/L — SIGNIFICANT CHANGE UP (ref 0–41)
ANION GAP SERPL CALC-SCNC: 18 MMOL/L — HIGH (ref 7–14)
AST SERPL-CCNC: 32 U/L — SIGNIFICANT CHANGE UP (ref 0–41)
BASOPHILS # BLD AUTO: 0 K/UL — SIGNIFICANT CHANGE UP (ref 0–0.2)
BASOPHILS NFR BLD AUTO: 0 % — SIGNIFICANT CHANGE UP (ref 0–1)
BILIRUB SERPL-MCNC: 0.5 MG/DL — SIGNIFICANT CHANGE UP (ref 0.2–1.2)
BUN SERPL-MCNC: 20 MG/DL — SIGNIFICANT CHANGE UP (ref 10–20)
CALCIUM SERPL-MCNC: 8.3 MG/DL — LOW (ref 8.5–10.1)
CHLORIDE SERPL-SCNC: 105 MMOL/L — SIGNIFICANT CHANGE UP (ref 98–110)
CO2 SERPL-SCNC: 18 MMOL/L — SIGNIFICANT CHANGE UP (ref 17–32)
CREAT SERPL-MCNC: 1 MG/DL — SIGNIFICANT CHANGE UP (ref 0.7–1.5)
EOSINOPHIL # BLD AUTO: 0 K/UL — SIGNIFICANT CHANGE UP (ref 0–0.7)
EOSINOPHIL NFR BLD AUTO: 0 % — SIGNIFICANT CHANGE UP (ref 0–8)
GLUCOSE SERPL-MCNC: 129 MG/DL — HIGH (ref 70–99)
HCT VFR BLD CALC: 32.4 % — LOW (ref 42–52)
HCT VFR BLD CALC: 33.2 % — LOW (ref 42–52)
HGB BLD-MCNC: 10.7 G/DL — LOW (ref 14–18)
HGB BLD-MCNC: 10.8 G/DL — LOW (ref 14–18)
IMM GRANULOCYTES NFR BLD AUTO: 0.4 % — HIGH (ref 0.1–0.3)
LYMPHOCYTES # BLD AUTO: 0.66 K/UL — LOW (ref 1.2–3.4)
LYMPHOCYTES # BLD AUTO: 6.9 % — LOW (ref 20.5–51.1)
MAGNESIUM SERPL-MCNC: 2.1 MG/DL — SIGNIFICANT CHANGE UP (ref 1.8–2.4)
MCHC RBC-ENTMCNC: 28.3 PG — SIGNIFICANT CHANGE UP (ref 27–31)
MCHC RBC-ENTMCNC: 28.5 PG — SIGNIFICANT CHANGE UP (ref 27–31)
MCHC RBC-ENTMCNC: 32.5 G/DL — SIGNIFICANT CHANGE UP (ref 32–37)
MCHC RBC-ENTMCNC: 33 G/DL — SIGNIFICANT CHANGE UP (ref 32–37)
MCV RBC AUTO: 86.4 FL — SIGNIFICANT CHANGE UP (ref 80–94)
MCV RBC AUTO: 86.9 FL — SIGNIFICANT CHANGE UP (ref 80–94)
MONOCYTES # BLD AUTO: 0.91 K/UL — HIGH (ref 0.1–0.6)
MONOCYTES NFR BLD AUTO: 9.5 % — HIGH (ref 1.7–9.3)
NEUTROPHILS # BLD AUTO: 8 K/UL — HIGH (ref 1.4–6.5)
NEUTROPHILS NFR BLD AUTO: 83.2 % — HIGH (ref 42.2–75.2)
NRBC # BLD: 0 /100 WBCS — SIGNIFICANT CHANGE UP (ref 0–0)
PLATELET # BLD AUTO: 219 K/UL — SIGNIFICANT CHANGE UP (ref 130–400)
PLATELET # BLD AUTO: 222 K/UL — SIGNIFICANT CHANGE UP (ref 130–400)
POTASSIUM SERPL-MCNC: 4.7 MMOL/L — SIGNIFICANT CHANGE UP (ref 3.5–5)
POTASSIUM SERPL-SCNC: 4.7 MMOL/L — SIGNIFICANT CHANGE UP (ref 3.5–5)
PROT SERPL-MCNC: 6.4 G/DL — SIGNIFICANT CHANGE UP (ref 6–8)
RBC # BLD: 3.75 M/UL — LOW (ref 4.7–6.1)
RBC # BLD: 3.82 M/UL — LOW (ref 4.7–6.1)
RBC # FLD: 14.1 % — SIGNIFICANT CHANGE UP (ref 11.5–14.5)
RBC # FLD: 14.2 % — SIGNIFICANT CHANGE UP (ref 11.5–14.5)
SODIUM SERPL-SCNC: 141 MMOL/L — SIGNIFICANT CHANGE UP (ref 135–146)
WBC # BLD: 10.64 K/UL — SIGNIFICANT CHANGE UP (ref 4.8–10.8)
WBC # BLD: 9.61 K/UL — SIGNIFICANT CHANGE UP (ref 4.8–10.8)
WBC # FLD AUTO: 10.64 K/UL — SIGNIFICANT CHANGE UP (ref 4.8–10.8)
WBC # FLD AUTO: 9.61 K/UL — SIGNIFICANT CHANGE UP (ref 4.8–10.8)

## 2018-07-20 RX ORDER — PANTOPRAZOLE SODIUM 20 MG/1
1 TABLET, DELAYED RELEASE ORAL
Qty: 30 | Refills: 0 | OUTPATIENT
Start: 2018-07-20 | End: 2018-08-18

## 2018-07-20 RX ADMIN — LOSARTAN POTASSIUM 100 MILLIGRAM(S): 100 TABLET, FILM COATED ORAL at 05:20

## 2018-07-20 RX ADMIN — SPIRONOLACTONE 25 MILLIGRAM(S): 25 TABLET, FILM COATED ORAL at 05:20

## 2018-07-20 RX ADMIN — PANTOPRAZOLE SODIUM 40 MILLIGRAM(S): 20 TABLET, DELAYED RELEASE ORAL at 06:03

## 2018-07-20 RX ADMIN — Medication 40 MILLIGRAM(S): at 05:20

## 2018-07-20 RX ADMIN — Medication 100 MILLIGRAM(S): at 05:20

## 2018-07-20 RX ADMIN — BUDESONIDE AND FORMOTEROL FUMARATE DIHYDRATE 2 PUFF(S): 160; 4.5 AEROSOL RESPIRATORY (INHALATION) at 12:44

## 2018-07-20 NOTE — DISCHARGE NOTE ADULT - CARE PROVIDER_API CALL
Juan Brito), Cardiac Electrophysiology; Cardiovascular Disease; [facility] Medicine  [address]  [place] [address]  Phone: [phone]  Fax: [phone]    Shona Reyes), Urology  [address]  [address]  [place] [address]  Phone: [phone]  Fax: [phone]    Hesham Rangel), Family Medicine  [address] [place] [address]  [address]  [place] [address]  Phone: [phone]  Fax: [phone]

## 2018-07-20 NOTE — DISCHARGE NOTE ADULT - CARE PLAN
Principal Discharge DX:	Atrial fibrillation, unspecified type  Goal:	treat and prevent complications of afib/flutter  Assessment and plan of treatment:	s/p successful ablation for afib/aflutter. Restarted rivaroxaban 20 qd.   Please follow up with Dr. Brito within 1 week.  Please return to ED if experiencing CP, palpitation, SOB, fever/chills, nausea/vomiting, incision site pain.  Secondary Diagnosis:	Urethral bleeding  Goal:	Treat and prevent complications of urethral bleeding  Assessment and plan of treatment:	Per urology, blood in lazcano due to urethral injury from misplaced lazcano. Dark yellow urine came out after lazcano replaced. Lazcano will be kept for at least 3 days and he may rebleed once the catheter is removed and when he voids due to scab coming off. He will be discharged with lazcano to bag and f/u as OP with Dr. Reyes in 3 days.   Please return to ED if worsening blood in lazcano.

## 2018-07-20 NOTE — DISCHARGE NOTE ADULT - PLAN OF CARE
treat and prevent complications of afib/flutter s/p successful ablation for afib/aflutter. Restarted rivaroxaban 20 qd.   Please follow up with Dr. Brito within 1 week.  Please return to ED if experiencing CP, palpitation, SOB, fever/chills, nausea/vomiting, incision site pain. Treat and prevent complications of urethral bleeding Per urology, blood in lazcano due to urethral injury from misplaced lazcano. Dark yellow urine came out after lazcano replaced. Lazcano will be kept for at least 3 days and he may rebleed once the catheter is removed and when he voids due to scab coming off. He will be discharged with lazcano to bag and f/u as OP with Dr. Reyes in 3 days.   Please return to ED if worsening blood in lazcano.

## 2018-07-20 NOTE — DISCHARGE NOTE ADULT - MEDICATION SUMMARY - MEDICATIONS TO TAKE
I will START or STAY ON the medications listed below when I get home from the hospital:    spironolactone 25 mg oral tablet  -- 1 tab(s) by mouth once a day  -- Indication: For htn    losartan 100 mg oral tablet  -- 1 tab(s) by mouth once a day  -- Indication: For htn    Xarelto 20 mg oral tablet  -- 1 tab(s) by mouth once a day  -- Indication: For Paroxysmal atrial fibrillation    simvastatin 40 mg oral tablet  -- 1 tab(s) by mouth once a day (at bedtime)  -- Indication: For hyperlipidemia    metoprolol tartrate 100 mg oral tablet  -- 1 tab(s) by mouth 2 times a day  -- Indication: For hypertension    Symbicort 160 mcg-4.5 mcg/inh inhalation aerosol  -- 2 puff(s) inhaled 2 times a day   -- Check with your doctor before becoming pregnant.  For inhalation only.  Rinse mouth thoroughly after use.    -- Indication: For Asthma    ProAir HFA 90 mcg/inh inhalation aerosol  -- 2 puff(s) inhaled 4 times a day, As Needed -for bronchospasm - for cough - for shortness of breath and/or wheezing   -- For inhalation only.  It is very important that you take or use this exactly as directed.  Do not skip doses or discontinue unless directed by your doctor.  Obtain medical advice before taking any non-prescription drugs as some may affect the action of this medication.  Shake well before use.    -- Indication: For Asthma    Lasix 40 mg oral tablet  -- 1 tab(s) by mouth once a day  -- Indication: For htn I will START or STAY ON the medications listed below when I get home from the hospital:    spironolactone 25 mg oral tablet  -- 1 tab(s) by mouth once a day  -- Indication: For htn    losartan 100 mg oral tablet  -- 1 tab(s) by mouth once a day  -- Indication: For htn    Xarelto 20 mg oral tablet  -- 1 tab(s) by mouth once a day  -- Indication: For Paroxysmal atrial fibrillation    simvastatin 40 mg oral tablet  -- 1 tab(s) by mouth once a day (at bedtime)  -- Indication: For hyperlipidemia    metoprolol tartrate 100 mg oral tablet  -- 1 tab(s) by mouth 2 times a day  -- Indication: For hypertension    Symbicort 160 mcg-4.5 mcg/inh inhalation aerosol  -- 2 puff(s) inhaled 2 times a day   -- Check with your doctor before becoming pregnant.  For inhalation only.  Rinse mouth thoroughly after use.    -- Indication: For Asthma    ProAir HFA 90 mcg/inh inhalation aerosol  -- 2 puff(s) inhaled 4 times a day, As Needed -for bronchospasm - for cough - for shortness of breath and/or wheezing   -- For inhalation only.  It is very important that you take or use this exactly as directed.  Do not skip doses or discontinue unless directed by your doctor.  Obtain medical advice before taking any non-prescription drugs as some may affect the action of this medication.  Shake well before use.    -- Indication: For Asthma    Lasix 40 mg oral tablet  -- 1 tab(s) by mouth once a day  -- Indication: For htn    pantoprazole 40 mg oral delayed release tablet  -- 1 tab(s) by mouth once a day (before a meal)  -- Indication: For gerd

## 2018-07-20 NOTE — DISCHARGE NOTE ADULT - HOSPITAL COURSE
64 yo M PMH of AFib/flutter on Xarelto, HTN, Obesity, Asthma, DLD, CHF presenting for elective A fib/flutter ablation. Successful Ablation performed by Dr Brito. He was admitted to CCU for monitoring and for urology evaluation due to hematuria after Lazcano placement. Xarelto was restarted. Kept home meds: Metoporolol, Losartan, Spironolactone, Simvastatin. Per urology, blood in lazcano due to urethral injury from misplaced lazcano. Dark yellow urine came out after lazcano replaced. Lazcano will be kept for at least 3 days and he may rebleed once the catheter is removed and when he voids due to scab coming off. He will be discharged with lazcano to bag and f/u as OP with Dr. Reyes. Outpatient Pulmonary eval for CORNELIO as Stop Bang>3. Labs and VS stable. Patient medically stable and will be discharged home. 64 yo M PMH of AFib/flutter on Xarelto, HTN, Obesity, Asthma, DLD, CHF presenting for elective A fib/flutter ablation. Successful Ablation performed by Dr Brito. He was admitted to CCU for monitoring and for urology evaluation due to hematuria after Lazcano placement. Xarelto was restarted. Kept home meds: Metoporolol, Losartan, Spironolactone, Simvastatin. Per urology, blood in lazcano due to urethral injury from misplaced lazcano. Dark yellow urine came out after lazcano replaced. Lazcano will be kept for at least 3 days and he may rebleed once the catheter is removed and when he voids due to scab coming off. He will be discharged with lazcano to bag and f/u as OP with Dr. Reyes. Patient's wife who is a nurse will take care of the lazcano at home. Outpatient eval for CORNELIO as Stop Bang>3. Labs and VS stable. Patient medically stable and will be discharged home.

## 2018-07-20 NOTE — PROGRESS NOTE ADULT - SUBJECTIVE AND OBJECTIVE BOX
INTERVAL HPI/OVERNIGHT EVENTS:    Patient s/p   AFIB          ablation.   No events over night  Patient in NSR  Galvan in place    MEDICATIONS  (STANDING):  buDESOnide 160 MICROgram(s)/formoterol 4.5 MICROgram(s) Inhaler 2 Puff(s) Inhalation two times a day  furosemide    Tablet 40 milliGRAM(s) Oral daily  losartan 100 milliGRAM(s) Oral daily  metoprolol tartrate 100 milliGRAM(s) Oral two times a day  pantoprazole    Tablet 40 milliGRAM(s) Oral before breakfast  rivaroxaban 20 milliGRAM(s) Oral every 24 hours  simvastatin 40 milliGRAM(s) Oral at bedtime  spironolactone 25 milliGRAM(s) Oral daily    MEDICATIONS  (PRN):  ALBUTerol    90 MICROgram(s) HFA Inhaler 2 Puff(s) Inhalation every 6 hours PRN Shortness of Breath  morphine  - Injectable 2 milliGRAM(s) IV Push every 15 minutes PRN Severe Pain  ondansetron Injectable 4 milliGRAM(s) IV Push once PRN Nausea and/or Vomiting      Allergies    No Known Allergies    Intolerances          Vital Signs Last 24 Hrs  T(C): 36.7 (20 Jul 2018 12:00), Max: 37.3 (19 Jul 2018 20:00)  T(F): 98 (20 Jul 2018 12:00), Max: 99.1 (19 Jul 2018 20:00)  HR: 63 (20 Jul 2018 15:00) (58 - 76)  BP: 141/7 (20 Jul 2018 15:00) (139/71 - 160/87)  BP(mean): 95 (20 Jul 2018 15:00) (92 - 119)  RR: 24 (20 Jul 2018 15:00) (16 - 31)  SpO2: 99% (20 Jul 2018 15:00) (96% - 99%)    HEENT OSCAR EOMI  Lung CTAB  Heart RRR normal S1 S2  abd +BS soft  groins No hematoma, no bleeding;  Ext no C/C/E    LABS:                        10.7   9.61  )-----------( 219      ( 20 Jul 2018 04:57 )             32.4     07-20    141  |  105  |  20  ----------------------------<  129<H>  4.7   |  18  |  1.0    Ca    8.3<L>      20 Jul 2018 04:57  Mg     2.1     07-20    TPro  6.4  /  Alb  4.2  /  TBili  0.5  /  DBili  x   /  AST  32  /  ALT  11  /  AlkPhos  57  07-20            A/P  Patient S/P AF  Ablation  Paietn is doing well  - continue Xarelto  - protonix 40 mg daily x 30 days  - No heavy lifting  - FU in office in 3-4 weeks  - consult appreciated

## 2018-07-20 NOTE — DISCHARGE NOTE ADULT - PATIENT PORTAL LINK FT
You can access the [facility] Patient Portal, offered by [facility], by registering with the following website: [email or web address]

## 2018-07-20 NOTE — CONSULT NOTE ADULT - SUBJECTIVE AND OBJECTIVE BOX
Patient is a 63y old  Male who presents with a chief complaint of Atrial fibrillation/ Atrial flutter ablation and hematuria (2018 18:12)      HPI:  64 yo M PMH of AFib/flutter on Xarelto, HTN, Obesity, Asthma, DLD, CHF? presenting for elective A fib/flutter ablation. Successful Ablation performed by Dr Brito. Patient Admitted to CCU for Monitoring and for urology evaluation due to hematuria after Galvan placement. (2018 18:12)    Urology:  Pt with pain and burning after Galvan catheter was placed. + blood in Galvan tubing with clots. Pt on Xarelto post ablation.  medium stream, + frequency  q 4 hrs, + nocturia x 2, denies urgency, hematuria, dysuria, hesitancy, straining, voids in small amounts, no straining PV fullness + PV dribbling.      PAST MEDICAL & SURGICAL HISTORY:  Hypercholesteremia  Asthma  COPD (chronic obstructive pulmonary disease)  Obesity  Pulmonary edema cardiac cause  Atrial fibrillation  HTN (hypertension)  History of hip replacement: right  H/O prior ablation treatment      REVIEW OF SYSTEMS:    CONSTITUTIONAL:  fevers or chills  HEENT: No visual changes  ENDO: No sweating  NECK: No pain or stiffness  MUSCULOSKELETAL: No back pain, no joint pain  RESPIRATORY: No shortness of breath  CARDIOVASCULAR: No chest pain  GASTROINTESTINAL: No abdominal or epigastric pain. No nausea, vomiting,  No diarrhea or constipation.   NEUROLOGICAL: No mental status changes  PSYCH: No depression, no mood changes  SKIN: No itching      MEDICATIONS  (STANDING):  buDESOnide 160 MICROgram(s)/formoterol 4.5 MICROgram(s) Inhaler 2 Puff(s) Inhalation two times a day  furosemide    Tablet 40 milliGRAM(s) Oral daily  losartan 100 milliGRAM(s) Oral daily  metoprolol tartrate 100 milliGRAM(s) Oral two times a day  pantoprazole    Tablet 40 milliGRAM(s) Oral before breakfast  rivaroxaban 20 milliGRAM(s) Oral every 24 hours  simvastatin 40 milliGRAM(s) Oral at bedtime  spironolactone 25 milliGRAM(s) Oral daily    MEDICATIONS  (PRN):  ALBUTerol    90 MICROgram(s) HFA Inhaler 2 Puff(s) Inhalation every 6 hours PRN Shortness of Breath  morphine  - Injectable 2 milliGRAM(s) IV Push every 15 minutes PRN Severe Pain  ondansetron Injectable 4 milliGRAM(s) IV Push once PRN Nausea and/or Vomiting      Allergies    No Known Allergies      SOCIAL HISTORY: No illicit drug use    FAMILY HISTORY:  Family history of cancer (Sibling): lung  CAD (coronary artery disease) (Father)      Vital Signs Last 24 Hrs  T(C): 36.8 (2018 08:05), Max: 37.3 (2018 20:00)  T(F): 98.2 (2018 08:05), Max: 99.1 (2018 20:00)  HR: 60 (2018 10:15) (58 - 80)  BP: 146/82 (2018 10:15) (139/71 - 158/82)  BP(mean): 109 (2018 10:15) (92 - 112)  RR: 23 (2018 10:15) (16 - 31)  SpO2: 99% (2018 10:15) (96% - 99%)    Daily Height in cm: 180.34 (2018 17:55)    Daily Weight in k.7 (2018 06:01)    PHYSICAL EXAM:    Constitutional: NAD, well-developed  Back: No CVA tenderness  Respiratory: No accessory respiratory muscle use  Abd: Soft, NT/ND  no organomegally  no hernia, bladder is not palpable.  :  + Galvan almost to mid urethra, + bleeding around catheter,  testicles x 2 nn tender  CHARLES: deferred at this time  Extremities: no edema  Neurological: A/O x 3  Psychiatric: Normal mood, normal affect  Skin: No rashes    I&O's Summary    2018 07:  -  2018 07:00  --------------------------------------------------------  IN: 200 mL / OUT: 615 mL / NET: -415 mL    :  -  2018 11:39  --------------------------------------------------------  IN: 300 mL / OUT: 120 mL / NET: 180 mL        LABS:                        10.7   9.61  )-----------( 219      ( 2018 04:57 )             32.4         141  |  105  |  20  ----------------------------<  129<H>  4.7   |  18  |  1.0    Ca    8.3<L>      2018 04:57  Mg     2.1         TPro  6.4  /  Alb  4.2  /  TBili  0.5  /  DBili  x   /  AST  32  /  ALT  11  /  AlkPhos  57

## 2018-07-20 NOTE — CONSULT NOTE ADULT - ASSESSMENT
62 y/o M with Traumatic Galvan insertion. Urethral bleeding.    A) misplaced Galvan catheter.    P) replace Galvan catheter.    Under sterile technique a 16 fr Coude catheter was easily passed into the bladder.  The catheter drained 700 cc of dark yellow urine.  The balloon was inflated with 10 cc of water.  The pt tolerated the procedure well.    Keep Galvan for at least 3 days. This will tamponade the bleeding urethra.  May anticoagulate as needed.   Pt will ooze around the Galvan. He may rebleed once the catheter is removed and when he voids due to scab   coming off.  no further acute urologic intervention at this time  He can f/u as OP with Dr. Reyes when d/c'd.  He can be d/c'd with Galvan to leg bag if necessary  if d/c'd with Galvan to f/u at [address] [facility]  [phone]  will d/w Dr. Reyes

## 2018-07-23 ENCOUNTER — APPOINTMENT (OUTPATIENT)
Dept: UROLOGY | Facility: CLINIC | Age: 63
End: 2018-07-23
Payer: MEDICARE

## 2018-07-23 VITALS
HEIGHT: 71 IN | WEIGHT: 260 LBS | SYSTOLIC BLOOD PRESSURE: 177 MMHG | BODY MASS INDEX: 36.4 KG/M2 | HEART RATE: 89 BPM | DIASTOLIC BLOOD PRESSURE: 104 MMHG

## 2018-07-23 PROBLEM — J44.9 CHRONIC OBSTRUCTIVE PULMONARY DISEASE, UNSPECIFIED: Chronic | Status: ACTIVE | Noted: 2018-07-09

## 2018-07-23 PROBLEM — J45.909 UNSPECIFIED ASTHMA, UNCOMPLICATED: Chronic | Status: ACTIVE | Noted: 2018-07-09

## 2018-07-23 PROBLEM — E66.9 OBESITY, UNSPECIFIED: Chronic | Status: ACTIVE | Noted: 2018-07-09

## 2018-07-23 PROBLEM — I48.91 UNSPECIFIED ATRIAL FIBRILLATION: Chronic | Status: ACTIVE | Noted: 2018-04-17

## 2018-07-23 PROBLEM — I10 ESSENTIAL (PRIMARY) HYPERTENSION: Chronic | Status: ACTIVE | Noted: 2018-04-17

## 2018-07-23 PROBLEM — E78.00 PURE HYPERCHOLESTEROLEMIA, UNSPECIFIED: Chronic | Status: ACTIVE | Noted: 2018-07-09

## 2018-07-23 PROCEDURE — 99212 OFFICE O/P EST SF 10 MIN: CPT

## 2018-07-25 ENCOUNTER — TRANSCRIPTION ENCOUNTER (OUTPATIENT)
Age: 63
End: 2018-07-25

## 2018-07-26 DIAGNOSIS — Z79.01 LONG TERM (CURRENT) USE OF ANTICOAGULANTS: ICD-10-CM

## 2018-07-26 DIAGNOSIS — I11.0 HYPERTENSIVE HEART DISEASE WITH HEART FAILURE: ICD-10-CM

## 2018-07-26 DIAGNOSIS — R31.9 HEMATURIA, UNSPECIFIED: ICD-10-CM

## 2018-07-26 DIAGNOSIS — I50.32 CHRONIC DIASTOLIC (CONGESTIVE) HEART FAILURE: ICD-10-CM

## 2018-07-26 DIAGNOSIS — I48.91 UNSPECIFIED ATRIAL FIBRILLATION: ICD-10-CM
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
## 2018-07-26 DIAGNOSIS — E78.00 PURE HYPERCHOLESTEROLEMIA, UNSPECIFIED: ICD-10-CM

## 2018-07-26 DIAGNOSIS — I25.10 ATHEROSCLEROTIC HEART DISEASE OF NATIVE CORONARY ARTERY WITHOUT ANGINA PECTORIS: ICD-10-CM

## 2018-07-26 DIAGNOSIS — T83.83XA HEMORRHAGE DUE TO GENITOURINARY PROSTHETIC DEVICES, IMPLANTS AND GRAFTS, INITIAL ENCOUNTER: ICD-10-CM

## 2018-07-26 DIAGNOSIS — E66.9 OBESITY, UNSPECIFIED: ICD-10-CM

## 2018-07-26 DIAGNOSIS — J45.909 UNSPECIFIED ASTHMA, UNCOMPLICATED: ICD-10-CM

## 2018-08-08 ENCOUNTER — APPOINTMENT (OUTPATIENT)
Dept: CARDIOLOGY | Facility: CLINIC | Age: 63
End: 2018-08-08

## 2018-08-08 VITALS
DIASTOLIC BLOOD PRESSURE: 93 MMHG | OXYGEN SATURATION: 95 % | WEIGHT: 248 LBS | HEART RATE: 135 BPM | BODY MASS INDEX: 34.59 KG/M2 | SYSTOLIC BLOOD PRESSURE: 149 MMHG

## 2018-08-08 DIAGNOSIS — I25.10 ATHEROSCLEROTIC HEART DISEASE OF NATIVE CORONARY ARTERY W/OUT ANGINA PECTORIS: ICD-10-CM

## 2018-08-08 DIAGNOSIS — I42.8 OTHER CARDIOMYOPATHIES: ICD-10-CM

## 2018-08-08 DIAGNOSIS — E78.5 HYPERLIPIDEMIA, UNSPECIFIED: ICD-10-CM

## 2018-08-08 DIAGNOSIS — Z82.5 FAMILY HISTORY OF ASTHMA AND OTHER CHRONIC LOWER RESPIRATORY DISEASES: ICD-10-CM

## 2018-08-08 DIAGNOSIS — I48.1 PERSISTENT ATRIAL FIBRILLATION: ICD-10-CM

## 2018-08-08 DIAGNOSIS — I10 ESSENTIAL (PRIMARY) HYPERTENSION: ICD-10-CM

## 2018-08-08 DIAGNOSIS — Z82.49 FAMILY HISTORY OF ISCHEMIC HEART DISEASE AND OTHER DISEASES OF THE CIRCULATORY SYSTEM: ICD-10-CM

## 2018-08-08 RX ORDER — ALBUTEROL SULFATE 0.63 MG/3ML
0.63 SOLUTION RESPIRATORY (INHALATION)
Qty: 75 | Refills: 0 | Status: COMPLETED | COMMUNITY
Start: 2018-04-18 | End: 2018-08-08

## 2018-08-08 RX ORDER — METOPROLOL TARTRATE 50 MG/1
50 TABLET, FILM COATED ORAL 3 TIMES DAILY
Qty: 90 | Refills: 3 | Status: ACTIVE | COMMUNITY
Start: 1900-01-01 | End: 1900-01-01

## 2018-08-25 ENCOUNTER — OUTPATIENT (OUTPATIENT)
Dept: OUTPATIENT SERVICES | Facility: HOSPITAL | Age: 63
LOS: 1 days | Discharge: HOME | End: 2018-08-25

## 2018-08-25 DIAGNOSIS — Z00.01 ENCOUNTER FOR GENERAL ADULT MEDICAL EXAMINATION WITH ABNORMAL FINDINGS: ICD-10-CM

## 2018-08-25 DIAGNOSIS — I48.2 CHRONIC ATRIAL FIBRILLATION: ICD-10-CM

## 2018-08-25 DIAGNOSIS — Z96.649 PRESENCE OF UNSPECIFIED ARTIFICIAL HIP JOINT: Chronic | ICD-10-CM

## 2018-08-25 DIAGNOSIS — I50.9 HEART FAILURE, UNSPECIFIED: ICD-10-CM

## 2018-08-25 DIAGNOSIS — J44.9 CHRONIC OBSTRUCTIVE PULMONARY DISEASE, UNSPECIFIED: ICD-10-CM

## 2018-08-25 DIAGNOSIS — Z98.890 OTHER SPECIFIED POSTPROCEDURAL STATES: Chronic | ICD-10-CM

## 2018-08-25 DIAGNOSIS — N40.0 BENIGN PROSTATIC HYPERPLASIA WITHOUT LOWER URINARY TRACT SYMPTOMS: ICD-10-CM

## 2018-08-30 ENCOUNTER — OUTPATIENT (OUTPATIENT)
Dept: OUTPATIENT SERVICES | Facility: HOSPITAL | Age: 63
LOS: 1 days | Discharge: HOME | End: 2018-08-30

## 2018-08-30 DIAGNOSIS — N39.0 URINARY TRACT INFECTION, SITE NOT SPECIFIED: ICD-10-CM

## 2018-08-30 DIAGNOSIS — Z98.890 OTHER SPECIFIED POSTPROCEDURAL STATES: Chronic | ICD-10-CM

## 2018-08-30 DIAGNOSIS — Z96.649 PRESENCE OF UNSPECIFIED ARTIFICIAL HIP JOINT: Chronic | ICD-10-CM

## 2018-09-24 ENCOUNTER — APPOINTMENT (OUTPATIENT)
Dept: UROLOGY | Facility: CLINIC | Age: 63
End: 2018-09-24
Payer: MEDICARE

## 2018-09-24 ENCOUNTER — LABORATORY RESULT (OUTPATIENT)
Age: 63
End: 2018-09-24

## 2018-09-24 ENCOUNTER — OUTPATIENT (OUTPATIENT)
Dept: OUTPATIENT SERVICES | Facility: HOSPITAL | Age: 63
LOS: 1 days | Discharge: HOME | End: 2018-09-24

## 2018-09-24 VITALS
HEIGHT: 71 IN | SYSTOLIC BLOOD PRESSURE: 153 MMHG | DIASTOLIC BLOOD PRESSURE: 88 MMHG | BODY MASS INDEX: 34.72 KG/M2 | HEART RATE: 79 BPM | WEIGHT: 248 LBS

## 2018-09-24 DIAGNOSIS — R31.29 OTHER MICROSCOPIC HEMATURIA: ICD-10-CM

## 2018-09-24 DIAGNOSIS — Z98.890 OTHER SPECIFIED POSTPROCEDURAL STATES: Chronic | ICD-10-CM

## 2018-09-24 DIAGNOSIS — Z96.649 PRESENCE OF UNSPECIFIED ARTIFICIAL HIP JOINT: Chronic | ICD-10-CM

## 2018-09-24 LAB
BILIRUB UR QL STRIP: NORMAL
CLARITY UR: CLEAR
COLLECTION METHOD: NORMAL
GLUCOSE UR-MCNC: NORMAL
HCG UR QL: NORMAL EU/DL
HGB UR QL STRIP.AUTO: NORMAL
KETONES UR-MCNC: NORMAL
LEUKOCYTE ESTERASE UR QL STRIP: NORMAL
NITRITE UR QL STRIP: NORMAL
PH UR STRIP: 5
PROT UR STRIP-MCNC: NORMAL
SP GR UR STRIP: 1.02

## 2018-09-24 PROCEDURE — 99214 OFFICE O/P EST MOD 30 MIN: CPT

## 2018-09-24 PROCEDURE — 81003 URINALYSIS AUTO W/O SCOPE: CPT | Mod: QW

## 2018-09-24 NOTE — ASSESSMENT
[FreeTextEntry1] : 63 year old male with microscopic hematuria. All options were reviewed and he is electing to undergo a formal work up including CT urogram, after a normal BMP, and cystoscopy. All aspects of cystoscopy were reviewed in detail with regards to preparation, outcomes, and adverse events. All aspects of contrast dye was reviewed. A UA C&S/cytology will be sent from the office today.

## 2018-09-24 NOTE — END OF VISIT
[FreeTextEntry3] : I have seen and Evaluated the patient with JAMAAL Gatica\par I agree with the content of her progress note and the plan of care outlined\par \par

## 2018-09-24 NOTE — HISTORY OF PRESENT ILLNESS
[Hematuria - Microscopic] : microscopic hematuria [None] : None [FreeTextEntry1] : Garret is a 63 year old male, with a history of traumatic Galvan s/p cardiac procedure. His catheter was removed in July. He has been voiding well without incident since. He presents today for evaluation of microscopic hematuria found by his PCP on routine lab work. UA showed large blood >50 RBC/HPF. He denies any episodes of gross hematuria or further issues.

## 2018-09-24 NOTE — PHYSICAL EXAM

## 2018-09-24 NOTE — LETTER BODY
[Dear  ___] : Dear ~LIZ, [Consult Letter:] : I had the pleasure of evaluating your patient, [unfilled]. [Please see my note below.] : Please see my note below. [Consult Closing:] : Thank you very much for allowing me to participate in the care of this patient.  If you have any questions, please do not hesitate to contact me. [Sincerely,] : Sincerely, [FreeTextEntry3] : Shona Reyes MD, FACS\par

## 2018-09-25 DIAGNOSIS — R31.29 OTHER MICROSCOPIC HEMATURIA: ICD-10-CM

## 2018-09-25 LAB
APPEARANCE: ABNORMAL
BILIRUBIN URINE: NEGATIVE
BLOOD URINE: NEGATIVE
COLOR: YELLOW
GLUCOSE QUALITATIVE U: NEGATIVE MG/DL
KETONES URINE: NEGATIVE
LEUKOCYTE ESTERASE URINE: NEGATIVE
NITRITE URINE: NEGATIVE
PH URINE: 6
PROTEIN URINE: NEGATIVE MG/DL
SPECIFIC GRAVITY URINE: 1.02
UROBILINOGEN URINE: 0.2 MG/DL (ref 0.2–?)

## 2018-09-27 LAB — BACTERIA UR CULT: NORMAL

## 2018-10-08 LAB
ANION GAP SERPL CALC-SCNC: 13 MMOL/L
BUN SERPL-MCNC: 19 MG/DL
CALCIUM SERPL-MCNC: 8.7 MG/DL
CHLORIDE SERPL-SCNC: 102 MMOL/L
CO2 SERPL-SCNC: 27 MMOL/L
CREAT SERPL-MCNC: 0.9 MG/DL
GLUCOSE SERPL-MCNC: 102 MG/DL
POTASSIUM SERPL-SCNC: 4.6 MMOL/L
SODIUM SERPL-SCNC: 142 MMOL/L

## 2018-11-01 ENCOUNTER — APPOINTMENT (OUTPATIENT)
Dept: UROLOGY | Facility: CLINIC | Age: 63
End: 2018-11-01

## 2019-01-10 ENCOUNTER — APPOINTMENT (OUTPATIENT)
Dept: UROLOGY | Facility: CLINIC | Age: 64
End: 2019-01-10
Payer: MEDICARE

## 2019-01-10 VITALS
HEART RATE: 81 BPM | DIASTOLIC BLOOD PRESSURE: 79 MMHG | HEIGHT: 71 IN | WEIGHT: 248 LBS | SYSTOLIC BLOOD PRESSURE: 165 MMHG | BODY MASS INDEX: 34.72 KG/M2

## 2019-01-10 PROCEDURE — 99214 OFFICE O/P EST MOD 30 MIN: CPT

## 2019-01-14 NOTE — LETTER BODY
[Dear  ___] : Dear  [unfilled], [Consult Letter:] : I had the pleasure of evaluating your patient, [unfilled]. [Please see my note below.] : Please see my note below. [Sincerely,] : Sincerely, [FreeTextEntry3] : Shona Reyes MD, FACS\par

## 2019-01-14 NOTE — HISTORY OF PRESENT ILLNESS
[FreeTextEntry1] : 62 yo with traumatic lazcano following cardiac procedure\par \par he was to have cystoscopy 11/2019\par he did not show up for the procedure\par \par he is here to review his prior urines and schedule cystoscopy \par \par he denies repeat episodes of gross hematuria

## 2019-01-14 NOTE — ASSESSMENT
[FreeTextEntry1] : 62 yo with hematuria following traumatic lazcano\par \par - repeat urine studies\par - renal and bladder US\par - cystoscopy next visit

## 2019-01-31 ENCOUNTER — APPOINTMENT (OUTPATIENT)
Dept: UROLOGY | Facility: CLINIC | Age: 64
End: 2019-01-31
Payer: MEDICARE

## 2019-01-31 VITALS
SYSTOLIC BLOOD PRESSURE: 144 MMHG | BODY MASS INDEX: 34.72 KG/M2 | DIASTOLIC BLOOD PRESSURE: 85 MMHG | HEART RATE: 81 BPM | WEIGHT: 248 LBS | HEIGHT: 71 IN

## 2019-01-31 DIAGNOSIS — R31.0 GROSS HEMATURIA: ICD-10-CM

## 2019-01-31 PROCEDURE — 52000 CYSTOURETHROSCOPY: CPT

## 2019-02-04 PROBLEM — R31.0 GROSS HEMATURIA: Status: ACTIVE | Noted: 2019-01-14

## 2019-04-11 ENCOUNTER — APPOINTMENT (OUTPATIENT)
Dept: UROLOGY | Facility: CLINIC | Age: 64
End: 2019-04-11
Payer: MEDICARE

## 2019-04-11 DIAGNOSIS — N13.8 BENIGN PROSTATIC HYPERPLASIA WITH LOWER URINARY TRACT SYMPMS: ICD-10-CM

## 2019-04-11 DIAGNOSIS — N40.1 BENIGN PROSTATIC HYPERPLASIA WITH LOWER URINARY TRACT SYMPMS: ICD-10-CM

## 2019-04-11 DIAGNOSIS — R39.9 UNSPECIFIED SYMPTOMS AND SIGNS INVOLVING THE GENITOURINARY SYSTEM: ICD-10-CM

## 2019-04-11 PROCEDURE — 99214 OFFICE O/P EST MOD 30 MIN: CPT

## 2019-04-11 RX ORDER — TAMSULOSIN HYDROCHLORIDE 0.4 MG/1
0.4 CAPSULE ORAL
Qty: 60 | Refills: 5 | Status: ACTIVE | COMMUNITY
Start: 2019-04-11 | End: 1900-01-01

## 2019-04-12 PROBLEM — R39.9 URINARY SYMPTOM OR SIGN: Status: ACTIVE | Noted: 2018-07-23

## 2019-04-12 PROBLEM — N40.1 BENIGN LOCALIZED HYPERPLASIA OF PROSTATE WITH URINARY OBSTRUCTION: Status: ACTIVE | Noted: 2019-01-14

## 2019-04-12 NOTE — HISTORY OF PRESENT ILLNESS
[Urinary Urgency] : urinary urgency [Nocturia] : nocturia [Urinary Frequency] : urinary frequency [Straining] : straining [Weak Stream] : weak stream [FreeTextEntry1] : 62 yo with traumatic lazcano following cardiac procedure\par \par cystoscopy 1/2019\par no cancer or abnormalities noted\par \par he denies repeat episodes of gross hematuria\par \par he states that he has persistent bothersome LUTS\par \par not on any meds [Hematuria - Gross] : no gross hematuria

## 2019-04-12 NOTE — PROCEDURE
[Flexible Cystoscope] : A flexible cystoscope was used to visualize the urethra and bladder. [Trilobar Hypertrophy] : had an enlargement of the lateral and median prostatic lobes

## 2019-04-12 NOTE — PHYSICAL EXAM
[General Appearance - Well Nourished] : well nourished [General Appearance - Well Developed] : well developed [Normal Appearance] : normal appearance [General Appearance - In No Acute Distress] : no acute distress [Well Groomed] : well groomed [Abdomen Tenderness] : non-tender [Abdomen Soft] : soft [Costovertebral Angle Tenderness] : no ~M costovertebral angle tenderness [Urethral Meatus] : meatus normal [Urinary Bladder Findings] : the bladder was normal on palpation [Scrotum] : the scrotum was normal [Testes Mass (___cm)] : there were no testicular masses [No Prostate Nodules] : no prostate nodules [Edema] : no peripheral edema [Respiration, Rhythm And Depth] : normal respiratory rhythm and effort [] : no respiratory distress [Exaggerated Use Of Accessory Muscles For Inspiration] : no accessory muscle use [Oriented To Time, Place, And Person] : oriented to person, place, and time [Mood] : the mood was normal [Affect] : the affect was normal [Not Anxious] : not anxious [No Focal Deficits] : no focal deficits [Normal Station and Gait] : the gait and station were normal for the patient's age [No Palpable Adenopathy] : no palpable adenopathy

## 2019-04-12 NOTE — LETTER BODY
[Dear  ___] : Dear  [unfilled], [Consult Letter:] : I had the pleasure of evaluating your patient, [unfilled]. [Please see my note below.] : Please see my note below. [Consult Closing:] : Thank you very much for allowing me to participate in the care of this patient.  If you have any questions, please do not hesitate to contact me. [Sincerely,] : Sincerely, [FreeTextEntry3] : Shona Reyes MD, FACS\par

## 2019-04-12 NOTE — ASSESSMENT
[Urinary Symptom or Sign (788.99\R39.89)] : implantation [FreeTextEntry1] : 62 yo with LUTS and prior urethral trauma (catheter)\par renal and bladder US reviewed 3/2019\par no hydro\par unremarkable bladder\par no prostatomegaly\par \par \par - flomax 0.4 mg po qhs\par - PSA \par - f/u in 6 months

## 2019-04-15 ENCOUNTER — RX RENEWAL (OUTPATIENT)
Age: 64
End: 2019-04-15

## 2019-04-15 RX ORDER — TAMSULOSIN HYDROCHLORIDE 0.4 MG/1
0.4 CAPSULE ORAL
Qty: 90 | Refills: 3 | Status: ACTIVE | COMMUNITY
Start: 2019-04-15 | End: 1900-01-01

## 2019-04-24 DIAGNOSIS — J06.9 ACUTE UPPER RESPIRATORY INFECTION, UNSPECIFIED: ICD-10-CM

## 2019-04-24 DIAGNOSIS — I10 ESSENTIAL (PRIMARY) HYPERTENSION: ICD-10-CM

## 2019-04-24 DIAGNOSIS — J20.9 ACUTE BRONCHITIS, UNSPECIFIED: ICD-10-CM

## 2019-04-24 DIAGNOSIS — R05 COUGH: ICD-10-CM

## 2019-04-24 DIAGNOSIS — Z96.641 PRESENCE OF RIGHT ARTIFICIAL HIP JOINT: ICD-10-CM

## 2019-05-18 ENCOUNTER — OUTPATIENT (OUTPATIENT)
Dept: OUTPATIENT SERVICES | Facility: HOSPITAL | Age: 64
LOS: 1 days | Discharge: HOME | End: 2019-05-18

## 2019-05-18 DIAGNOSIS — Z98.890 OTHER SPECIFIED POSTPROCEDURAL STATES: Chronic | ICD-10-CM

## 2019-05-18 DIAGNOSIS — I25.2 OLD MYOCARDIAL INFARCTION: ICD-10-CM

## 2019-05-18 DIAGNOSIS — I48.2 CHRONIC ATRIAL FIBRILLATION: ICD-10-CM

## 2019-05-18 DIAGNOSIS — E66.01 MORBID (SEVERE) OBESITY DUE TO EXCESS CALORIES: ICD-10-CM

## 2019-05-18 DIAGNOSIS — I10 ESSENTIAL (PRIMARY) HYPERTENSION: ICD-10-CM

## 2019-05-18 DIAGNOSIS — Z96.649 PRESENCE OF UNSPECIFIED ARTIFICIAL HIP JOINT: Chronic | ICD-10-CM

## 2019-05-18 DIAGNOSIS — E78.2 MIXED HYPERLIPIDEMIA: ICD-10-CM

## 2019-05-18 DIAGNOSIS — N40.0 BENIGN PROSTATIC HYPERPLASIA WITHOUT LOWER URINARY TRACT SYMPTOMS: ICD-10-CM

## 2019-09-25 ENCOUNTER — APPOINTMENT (OUTPATIENT)
Dept: UROLOGY | Facility: CLINIC | Age: 64
End: 2019-09-25

## 2019-10-14 ENCOUNTER — APPOINTMENT (OUTPATIENT)
Dept: UROLOGY | Facility: CLINIC | Age: 64
End: 2019-10-14

## 2019-10-28 ENCOUNTER — APPOINTMENT (OUTPATIENT)
Dept: UROLOGY | Facility: CLINIC | Age: 64
End: 2019-10-28

## 2019-12-12 ENCOUNTER — OUTPATIENT (OUTPATIENT)
Dept: OUTPATIENT SERVICES | Facility: HOSPITAL | Age: 64
LOS: 1 days | Discharge: HOME | End: 2019-12-12

## 2019-12-12 DIAGNOSIS — I25.10 ATHEROSCLEROTIC HEART DISEASE OF NATIVE CORONARY ARTERY WITHOUT ANGINA PECTORIS: ICD-10-CM

## 2019-12-12 DIAGNOSIS — Z96.649 PRESENCE OF UNSPECIFIED ARTIFICIAL HIP JOINT: Chronic | ICD-10-CM

## 2019-12-12 DIAGNOSIS — Z98.890 OTHER SPECIFIED POSTPROCEDURAL STATES: Chronic | ICD-10-CM

## 2019-12-12 DIAGNOSIS — B58.81 TOXOPLASMA MYOCARDITIS: ICD-10-CM

## 2020-01-23 NOTE — ED PROVIDER NOTE - DISCHARGE REVIEW MATERIAL PRESENTED
Bed: 05  Expected date:   Expected time:   Means of arrival:   Comments:  54yoM flu like symptoms/fever .

## 2020-02-06 ENCOUNTER — TRANSCRIPTION ENCOUNTER (OUTPATIENT)
Age: 65
End: 2020-02-06

## 2020-02-06 ENCOUNTER — EMERGENCY (EMERGENCY)
Facility: HOSPITAL | Age: 65
LOS: 0 days | Discharge: HOME | End: 2020-02-06
Attending: EMERGENCY MEDICINE | Admitting: EMERGENCY MEDICINE
Payer: MEDICARE

## 2020-02-06 VITALS
DIASTOLIC BLOOD PRESSURE: 108 MMHG | SYSTOLIC BLOOD PRESSURE: 183 MMHG | RESPIRATION RATE: 18 BRPM | TEMPERATURE: 100 F | WEIGHT: 255.07 LBS | OXYGEN SATURATION: 92 % | HEART RATE: 125 BPM

## 2020-02-06 VITALS
DIASTOLIC BLOOD PRESSURE: 89 MMHG | TEMPERATURE: 102 F | HEART RATE: 96 BPM | SYSTOLIC BLOOD PRESSURE: 146 MMHG | RESPIRATION RATE: 19 BRPM | OXYGEN SATURATION: 96 %

## 2020-02-06 DIAGNOSIS — A41.9 SEPSIS, UNSPECIFIED ORGANISM: ICD-10-CM

## 2020-02-06 DIAGNOSIS — J06.9 ACUTE UPPER RESPIRATORY INFECTION, UNSPECIFIED: ICD-10-CM

## 2020-02-06 DIAGNOSIS — Z98.890 OTHER SPECIFIED POSTPROCEDURAL STATES: Chronic | ICD-10-CM

## 2020-02-06 DIAGNOSIS — R50.9 FEVER, UNSPECIFIED: ICD-10-CM

## 2020-02-06 DIAGNOSIS — R06.02 SHORTNESS OF BREATH: ICD-10-CM

## 2020-02-06 DIAGNOSIS — R05 COUGH: ICD-10-CM

## 2020-02-06 DIAGNOSIS — Z96.649 PRESENCE OF UNSPECIFIED ARTIFICIAL HIP JOINT: Chronic | ICD-10-CM

## 2020-02-06 LAB
ALBUMIN SERPL ELPH-MCNC: 4.2 G/DL — SIGNIFICANT CHANGE UP (ref 3.5–5.2)
ALP SERPL-CCNC: 78 U/L — SIGNIFICANT CHANGE UP (ref 30–115)
ALT FLD-CCNC: 20 U/L — SIGNIFICANT CHANGE UP (ref 0–41)
ANION GAP SERPL CALC-SCNC: 13 MMOL/L — SIGNIFICANT CHANGE UP (ref 7–14)
APPEARANCE UR: CLEAR — SIGNIFICANT CHANGE UP
AST SERPL-CCNC: 25 U/L — SIGNIFICANT CHANGE UP (ref 0–41)
BACTERIA # UR AUTO: NEGATIVE — SIGNIFICANT CHANGE UP
BASE EXCESS BLDV CALC-SCNC: 2 MMOL/L — SIGNIFICANT CHANGE UP (ref -2–2)
BASOPHILS # BLD AUTO: 0.07 K/UL — SIGNIFICANT CHANGE UP (ref 0–0.2)
BASOPHILS NFR BLD AUTO: 0.4 % — SIGNIFICANT CHANGE UP (ref 0–1)
BILIRUB SERPL-MCNC: 0.3 MG/DL — SIGNIFICANT CHANGE UP (ref 0.2–1.2)
BILIRUB UR-MCNC: NEGATIVE — SIGNIFICANT CHANGE UP
BUN SERPL-MCNC: 21 MG/DL — HIGH (ref 10–20)
CA-I SERPL-SCNC: 1.2 MMOL/L — SIGNIFICANT CHANGE UP (ref 1.12–1.3)
CALCIUM SERPL-MCNC: 9.2 MG/DL — SIGNIFICANT CHANGE UP (ref 8.5–10.1)
CHLORIDE SERPL-SCNC: 102 MMOL/L — SIGNIFICANT CHANGE UP (ref 98–110)
CO2 SERPL-SCNC: 22 MMOL/L — SIGNIFICANT CHANGE UP (ref 17–32)
COLOR SPEC: YELLOW — SIGNIFICANT CHANGE UP
CREAT SERPL-MCNC: 0.9 MG/DL — SIGNIFICANT CHANGE UP (ref 0.7–1.5)
DIFF PNL FLD: ABNORMAL
EOSINOPHIL # BLD AUTO: 0.39 K/UL — SIGNIFICANT CHANGE UP (ref 0–0.7)
EOSINOPHIL NFR BLD AUTO: 2.3 % — SIGNIFICANT CHANGE UP (ref 0–8)
EPI CELLS # UR: 4 /HPF — SIGNIFICANT CHANGE UP (ref 0–5)
FLU A RESULT: NEGATIVE — SIGNIFICANT CHANGE UP
FLU A RESULT: NEGATIVE — SIGNIFICANT CHANGE UP
FLUAV AG NPH QL: NEGATIVE — SIGNIFICANT CHANGE UP
FLUBV AG NPH QL: NEGATIVE — SIGNIFICANT CHANGE UP
GAS PNL BLDV: 139 MMOL/L — SIGNIFICANT CHANGE UP (ref 136–145)
GAS PNL BLDV: SIGNIFICANT CHANGE UP
GLUCOSE SERPL-MCNC: 146 MG/DL — HIGH (ref 70–99)
GLUCOSE UR QL: NEGATIVE — SIGNIFICANT CHANGE UP
HCO3 BLDV-SCNC: 28 MMOL/L — SIGNIFICANT CHANGE UP (ref 22–29)
HCT VFR BLD CALC: 40.7 % — LOW (ref 42–52)
HCT VFR BLDA CALC: 44.6 % — HIGH (ref 34–44)
HGB BLD CALC-MCNC: 14.5 G/DL — SIGNIFICANT CHANGE UP (ref 14–18)
HGB BLD-MCNC: 13.3 G/DL — LOW (ref 14–18)
HYALINE CASTS # UR AUTO: 2 /LPF — SIGNIFICANT CHANGE UP (ref 0–7)
IMM GRANULOCYTES NFR BLD AUTO: 0.4 % — HIGH (ref 0.1–0.3)
KETONES UR-MCNC: SIGNIFICANT CHANGE UP
LACTATE BLDV-MCNC: 1.5 MMOL/L — SIGNIFICANT CHANGE UP (ref 0.5–1.6)
LACTATE SERPL-SCNC: 1.5 MMOL/L — SIGNIFICANT CHANGE UP (ref 0.7–2)
LEUKOCYTE ESTERASE UR-ACNC: NEGATIVE — SIGNIFICANT CHANGE UP
LIDOCAIN IGE QN: 30 U/L — SIGNIFICANT CHANGE UP (ref 7–60)
LYMPHOCYTES # BLD AUTO: 0.98 K/UL — LOW (ref 1.2–3.4)
LYMPHOCYTES # BLD AUTO: 5.7 % — LOW (ref 20.5–51.1)
MAGNESIUM SERPL-MCNC: 2 MG/DL — SIGNIFICANT CHANGE UP (ref 1.8–2.4)
MCHC RBC-ENTMCNC: 29 PG — SIGNIFICANT CHANGE UP (ref 27–31)
MCHC RBC-ENTMCNC: 32.7 G/DL — SIGNIFICANT CHANGE UP (ref 32–37)
MCV RBC AUTO: 88.7 FL — SIGNIFICANT CHANGE UP (ref 80–94)
MONOCYTES # BLD AUTO: 1.18 K/UL — HIGH (ref 0.1–0.6)
MONOCYTES NFR BLD AUTO: 6.9 % — SIGNIFICANT CHANGE UP (ref 1.7–9.3)
NEUTROPHILS # BLD AUTO: 14.5 K/UL — HIGH (ref 1.4–6.5)
NEUTROPHILS NFR BLD AUTO: 84.3 % — HIGH (ref 42.2–75.2)
NITRITE UR-MCNC: NEGATIVE — SIGNIFICANT CHANGE UP
NRBC # BLD: 0 /100 WBCS — SIGNIFICANT CHANGE UP (ref 0–0)
NT-PROBNP SERPL-SCNC: 624 PG/ML — HIGH (ref 0–300)
PCO2 BLDV: 48 MMHG — SIGNIFICANT CHANGE UP (ref 41–51)
PH BLDV: 7.37 — SIGNIFICANT CHANGE UP (ref 7.26–7.43)
PH UR: 5.5 — SIGNIFICANT CHANGE UP (ref 5–8)
PLATELET # BLD AUTO: 234 K/UL — SIGNIFICANT CHANGE UP (ref 130–400)
PO2 BLDV: 19 MMHG — LOW (ref 20–40)
POTASSIUM BLDV-SCNC: 4.2 MMOL/L — SIGNIFICANT CHANGE UP (ref 3.3–5.6)
POTASSIUM SERPL-MCNC: 4.6 MMOL/L — SIGNIFICANT CHANGE UP (ref 3.5–5)
POTASSIUM SERPL-SCNC: 4.6 MMOL/L — SIGNIFICANT CHANGE UP (ref 3.5–5)
PROT SERPL-MCNC: 7.5 G/DL — SIGNIFICANT CHANGE UP (ref 6–8)
PROT UR-MCNC: ABNORMAL
RBC # BLD: 4.59 M/UL — LOW (ref 4.7–6.1)
RBC # FLD: 13.7 % — SIGNIFICANT CHANGE UP (ref 11.5–14.5)
RBC CASTS # UR COMP ASSIST: 3 /HPF — SIGNIFICANT CHANGE UP (ref 0–4)
RSV RESULT: NEGATIVE — SIGNIFICANT CHANGE UP
RSV RNA RESP QL NAA+PROBE: NEGATIVE — SIGNIFICANT CHANGE UP
SAO2 % BLDV: 26 % — SIGNIFICANT CHANGE UP
SODIUM SERPL-SCNC: 137 MMOL/L — SIGNIFICANT CHANGE UP (ref 135–146)
SP GR SPEC: 1.03 — HIGH (ref 1.01–1.02)
TROPONIN T SERPL-MCNC: <0.01 NG/ML — SIGNIFICANT CHANGE UP
UROBILINOGEN FLD QL: SIGNIFICANT CHANGE UP
WBC # BLD: 17.19 K/UL — HIGH (ref 4.8–10.8)
WBC # FLD AUTO: 17.19 K/UL — HIGH (ref 4.8–10.8)
WBC UR QL: 5 /HPF — SIGNIFICANT CHANGE UP (ref 0–5)

## 2020-02-06 PROCEDURE — 93010 ELECTROCARDIOGRAM REPORT: CPT

## 2020-02-06 PROCEDURE — 71045 X-RAY EXAM CHEST 1 VIEW: CPT | Mod: 26

## 2020-02-06 PROCEDURE — 99285 EMERGENCY DEPT VISIT HI MDM: CPT

## 2020-02-06 RX ORDER — ACETAMINOPHEN 500 MG
650 TABLET ORAL ONCE
Refills: 0 | Status: COMPLETED | OUTPATIENT
Start: 2020-02-06 | End: 2020-02-06

## 2020-02-06 RX ADMIN — Medication 650 MILLIGRAM(S): at 21:05

## 2020-02-06 NOTE — ED PROVIDER NOTE - CARE PROVIDERS DIRECT ADDRESSES
[email or web address]

## 2020-02-06 NOTE — ED PROVIDER NOTE - CARE PLAN
Principal Discharge DX:	Acute upper respiratory infection Principal Discharge DX:	Acute upper respiratory infection  Secondary Diagnosis:	Sepsis

## 2020-02-06 NOTE — ED PROVIDER NOTE - PATIENT PORTAL LINK FT
You can access the FollowMyHealth Patient Portal offered by [facility] by registering at the following website: [email or web address]. By joining [employer]’s FollowMyHealth portal, you will also be able to view your health information using other applications (apps) compatible with our system.

## 2020-02-06 NOTE — ED ADULT NURSE NOTE - OBJECTIVE STATEMENT
PT PRESENTS TO ED FOR COLD-LIKE SYMPTOMS, WHEEZING, LOW GRADE FEVER TMAX 100.3, AND AS PER WIFE PT NOW HAS "FLUID IN LUNGS." DENIES PAIN. CARDIAC MONITORING MAINTAINED.

## 2020-02-06 NOTE — ED PROCEDURE NOTE - ATTENDING CONTRIBUTION TO CARE
I was present for and supervised the key/critical aspects of the procedures performed during the care of this patient.

## 2020-02-06 NOTE — ED PROVIDER NOTE - OBJECTIVE STATEMENT
63y/o M w/ hx of COPD not on home O2, afib s/p ablation on xarelto and metoprolol, htn, hypercholesterima, pulmonary edema presents for low grade fever, cough, congestion, runny nose. no vomiting or diarrhea.  pt was taken to urgent care and an xray was done and showed pulmonary edema so pt was sent here. pt found to have HR of 150s afib rvr.  pt denies cp or sob.  leg swelling is at baseline.  no hx of blood clots. no immobilization, no travel hx.

## 2020-02-06 NOTE — ED ADULT NURSE NOTE - PMH
Asthma    Atrial fibrillation    COPD (chronic obstructive pulmonary disease)    HTN (hypertension)    Hypercholesteremia    Obesity    Pulmonary edema cardiac cause

## 2020-02-06 NOTE — ED PROVIDER NOTE - ATTENDING CONTRIBUTION TO CARE
Called and left ms for pt to reschedule her appt on 08-27-19   64yoM with h/o afib on Xarelto, CHF, COPD, HTN, HLD, presents with cough, congestion, rhinorrhea, subjective fever. Denies CP, SOB, leg pain or swelling, and all other symptoms. On exam, febrile, hemodynamically stable, saturating well, NAD, nontoxic appearing, speaking full sentences without effort, head NCAT, EOMI grossly, anicteric, MMM, no JVD, RRR, nml S1/S2, no m/r/g, lungs diffuse wheezes and rhonchi, abd soft, NT, ND, nml BS, no rebound or guarding, AAO, CN's 3-12 grossly intact, CLARK spontaneously, no leg cyanosis or edema, skin warm, well perfused, no rashes or hives. Character low suspicion for PE and no associated signs of DVT. No e/o PNA. 64yoM with h/o afib on Xarelto, CHF, COPD, HTN, HLD, presents with cough, congestion, rhinorrhea, subjective fever. Denies CP, SOB, leg pain or swelling, and all other symptoms. On exam, febrile, hemodynamically stable, saturating well, NAD, nontoxic appearing, speaking full sentences without effort, head NCAT, EOMI grossly, anicteric, MMM, no JVD, RRR, nml S1/S2, no m/r/g, lungs diffuse wheezes and rhonchi, abd soft, NT, ND, nml BS, no rebound or guarding, AAO, CN's 3-12 grossly intact, CLARK spontaneously, no leg cyanosis or edema, skin warm, well perfused, no rashes or hives. Character low suspicion for PE and no associated signs of DVT. No e/o PNA. Lung sounds concerning for combination of CHF and COPD, as such not given fluids for sepsis. Character of infection more c/w viral and as such not given abx. Patient well appearing, hemodynamically stable, no increased WOB, satting well. Given Tylenol with improvement in tachycardia. Admitted to internal medicine for further monitoring, w/u, and care. 64yoM with h/o afib on Xarelto, CHF, COPD, HTN, HLD, presents with cough, congestion, rhinorrhea, subjective fever. Denies CP, SOB, leg pain or swelling, and all other symptoms. On exam, febrile, hemodynamically stable, saturating well, NAD, nontoxic appearing, speaking full sentences without effort, head NCAT, EOMI grossly, anicteric, MMM, no JVD, RRR, nml S1/S2, no m/r/g, lungs diffuse wheezes and rhonchi, abd soft, NT, ND, nml BS, no rebound or guarding, AAO, CN's 3-12 grossly intact, CLARK spontaneously, no leg cyanosis or edema, skin warm, well perfused, no rashes or hives. Character low suspicion for PE and no associated signs of DVT. No e/o PNA. Lung sounds concerning for combination of CHF and COPD, as such not given fluids for sepsis. Character of infection more c/w viral and as such not given abx. Patient well appearing, hemodynamically stable, no increased WOB, satting well. Given Tylenol with improvement in tachycardia, also took his metoprolol in the waiting room. Patient is well appearing, NAD, hemodynamically stable, no increased WOB. Had shared decision making  with family and pt and offered admission. However no SOB and well appearing and have PMD appt tomorrow. Discharged with instructions in further symptomatic care, return precautions, and need for PMD f/u.

## 2020-02-06 NOTE — ED ADULT TRIAGE NOTE - CHIEF COMPLAINT QUOTE
pt c/o cold symptoms since yesterday; as per wife, coming from Urgent care center with "fluid on his lungs"

## 2020-02-06 NOTE — ED PROVIDER NOTE - PHYSICAL EXAMINATION
VITAL SIGNS: I have reviewed nursing notes and confirm.  CONSTITUTIONAL: Well-developed; well-nourished; in no acute distress. pt comfortable. +obeseity  SKIN: skin exam is warm and dry, no acute rash.   HEAD: Normocephalic; atraumatic.  EYES:  EOM intact; conjunctiva and sclera clear.  ENT: No nasal discharge; airway clear. moist oral mucosa;   NECK: Supple; non tender.  CARD: S1, S2 normal; no murmurs, gallops, or rubs. Regular rate and rhythm. posterior tibial and radial pulses 2+  RESP: +crackles b/l.  cta b/l. no use of accessory muscles. no retractions  ABD: Normal bowel sounds; soft; non-distended; non-tender; no rebound.   EXT: Normal ROM. No  cyanosis. mild pitting edema b/l. no calf tenderness  BACK: No cva tenderness  LYMPH: No acute cervical adenopathy.  NEURO: Alert, oriented, grossly unremarkable.    PSYCH: Cooperative, appropriate.

## 2020-02-06 NOTE — ED PROVIDER NOTE - CARE PROVIDER_API CALL
Hesham Rangel)  Family Medicine  [address], [address]  [place] [address]  Phone: [phone]  Fax: [phone]  Follow Up Time:

## 2020-02-06 NOTE — ED PROVIDER NOTE - CLINICAL SUMMARY MEDICAL DECISION MAKING FREE TEXT BOX
64yoM with h/o afib on Xarelto, CHF, COPD, HTN, HLD, presents with cough, congestion, rhinorrhea, subjective fever. Denies CP, SOB, leg pain or swelling, and all other symptoms. On exam, febrile, hemodynamically stable, saturating well, NAD, nontoxic appearing, speaking full sentences without effort, head NCAT, EOMI grossly, anicteric, MMM, no JVD, RRR, nml S1/S2, no m/r/g, lungs diffuse wheezes and rhonchi, abd soft, NT, ND, nml BS, no rebound or guarding, AAO, CN's 3-12 grossly intact, CLARK spontaneously, no leg cyanosis or edema, skin warm, well perfused, no rashes or hives. Character low suspicion for PE and no associated signs of DVT. No e/o PNA. Lung sounds concerning for combination of CHF and COPD, as such not given fluids for sepsis. Character of infection more c/w viral and as such not given abx. Patient well appearing, hemodynamically stable, no increased WOB, satting well. Given Tylenol with improvement in tachycardia. Admitted to internal medicine for further monitoring, w/u, and care. 64yoM with h/o afib on Xarelto, CHF, COPD, HTN, HLD, presents with cough, congestion, rhinorrhea, subjective fever. Denies CP, SOB, leg pain or swelling, and all other symptoms. On exam, febrile, hemodynamically stable, saturating well, NAD, nontoxic appearing, speaking full sentences without effort, head NCAT, EOMI grossly, anicteric, MMM, no JVD, RRR, nml S1/S2, no m/r/g, lungs diffuse wheezes and rhonchi, abd soft, NT, ND, nml BS, no rebound or guarding, AAO, CN's 3-12 grossly intact, CLARK spontaneously, no leg cyanosis or edema, skin warm, well perfused, no rashes or hives. Character low suspicion for PE and no associated signs of DVT. No e/o PNA. Lung sounds concerning for combination of CHF and COPD, as such not given fluids for sepsis. Character of infection more c/w viral and as such not given abx. Patient well appearing, hemodynamically stable, no increased WOB, satting well. Given Tylenol with improvement in tachycardia, also took his metoprolol in the waiting room. Patient is well appearing, NAD, hemodynamically stable, no increased WOB. Had shared decision making  with family and pt and offered admission. However no SOB and well appearing and have PMD appt tomorrow. Discharged with instructions in further symptomatic care, return precautions, and need for PMD f/u.

## 2020-02-06 NOTE — ED PROVIDER NOTE - NS ED ROS FT
Constitutional: (+ fever  Eyes/ENT: (-) blurry vision, (-) epistaxis  Cardiovascular: (-) chest pain, (-) syncope  Respiratory: (+) cough, (-) shortness of breath  Gastrointestinal: (-) vomiting, (-) diarrhea  Musculoskeletal: (-) neck pain, (-) back pain, (-) joint pain  Integumentary: (-) rash, (-) edema  Neurological: (-) headache, (-) altered mental status  Psychiatric: (-) hallucinations  Allergic/Immunologic: (-) pruritus

## 2020-02-08 LAB
CULTURE RESULTS: SIGNIFICANT CHANGE UP
SPECIMEN SOURCE: SIGNIFICANT CHANGE UP

## 2020-02-12 LAB
CULTURE RESULTS: SIGNIFICANT CHANGE UP
CULTURE RESULTS: SIGNIFICANT CHANGE UP
SPECIMEN SOURCE: SIGNIFICANT CHANGE UP
SPECIMEN SOURCE: SIGNIFICANT CHANGE UP

## 2020-11-23 NOTE — ED PROVIDER NOTE - ATTENDING CONTRIBUTION TO CARE
I personally evaluated patient. I agree with the findings and plan with all documentation on chart except as documented  in my note.      Patient has stated medical history and presents with cough and a wheeze.  Patient does not have crackles or peripheral swelling.  Patient has rhonchi to right base. Labs done and patient has a normal WBC count.  BNP slightly elevated but CXR does not show pulmonary edema. Patient and wife spoken to and would prefer treatment as an outpatient.  Patient and wife aware he may have to return for admission for continued or worsening condition/warning signs.  Full DC instructions discussed and patient knows when to seek immediate medical attention.  Patient has proper follow up.  All results discussed and patient aware they may require further work up.  Proper follow up ensured. Limitations of ED work up discussed.  Medications administered and prescribed/OTC home meds discussed.  All questions and concerns from patient or family addressed. Understanding of instructions verbalized. Primary Defect Length In Cm (Final Defect Size - Required For Flaps/Grafts): 2.1

## 2021-01-11 ENCOUNTER — TRANSCRIPTION ENCOUNTER (OUTPATIENT)
Age: 66
End: 2021-01-11

## 2021-01-13 ENCOUNTER — TRANSCRIPTION ENCOUNTER (OUTPATIENT)
Age: 66
End: 2021-01-13

## 2021-01-14 ENCOUNTER — EMERGENCY (EMERGENCY)
Facility: HOSPITAL | Age: 66
LOS: 0 days | Discharge: HOME | End: 2021-01-14
Attending: EMERGENCY MEDICINE | Admitting: EMERGENCY MEDICINE
Payer: MEDICARE

## 2021-01-14 VITALS
HEART RATE: 95 BPM | DIASTOLIC BLOOD PRESSURE: 100 MMHG | OXYGEN SATURATION: 99 % | RESPIRATION RATE: 18 BRPM | TEMPERATURE: 99 F | SYSTOLIC BLOOD PRESSURE: 176 MMHG

## 2021-01-14 VITALS
HEART RATE: 97 BPM | OXYGEN SATURATION: 100 % | DIASTOLIC BLOOD PRESSURE: 100 MMHG | SYSTOLIC BLOOD PRESSURE: 171 MMHG | WEIGHT: 214.95 LBS | RESPIRATION RATE: 18 BRPM | TEMPERATURE: 98 F | HEIGHT: 71 IN

## 2021-01-14 DIAGNOSIS — E66.9 OBESITY, UNSPECIFIED: ICD-10-CM

## 2021-01-14 DIAGNOSIS — Z96.641 PRESENCE OF RIGHT ARTIFICIAL HIP JOINT: ICD-10-CM

## 2021-01-14 DIAGNOSIS — J45.909 UNSPECIFIED ASTHMA, UNCOMPLICATED: ICD-10-CM

## 2021-01-14 DIAGNOSIS — Z79.01 LONG TERM (CURRENT) USE OF ANTICOAGULANTS: ICD-10-CM

## 2021-01-14 DIAGNOSIS — J44.9 CHRONIC OBSTRUCTIVE PULMONARY DISEASE, UNSPECIFIED: ICD-10-CM

## 2021-01-14 DIAGNOSIS — U07.1 COVID-19: ICD-10-CM

## 2021-01-14 DIAGNOSIS — I48.91 UNSPECIFIED ATRIAL FIBRILLATION: ICD-10-CM

## 2021-01-14 DIAGNOSIS — Z96.649 PRESENCE OF UNSPECIFIED ARTIFICIAL HIP JOINT: Chronic | ICD-10-CM

## 2021-01-14 DIAGNOSIS — R50.9 FEVER, UNSPECIFIED: ICD-10-CM

## 2021-01-14 DIAGNOSIS — Z79.51 LONG TERM (CURRENT) USE OF INHALED STEROIDS: ICD-10-CM

## 2021-01-14 DIAGNOSIS — E78.5 HYPERLIPIDEMIA, UNSPECIFIED: ICD-10-CM

## 2021-01-14 DIAGNOSIS — I10 ESSENTIAL (PRIMARY) HYPERTENSION: ICD-10-CM

## 2021-01-14 DIAGNOSIS — Z98.890 OTHER SPECIFIED POSTPROCEDURAL STATES: Chronic | ICD-10-CM

## 2021-01-14 DIAGNOSIS — Z98.890 OTHER SPECIFIED POSTPROCEDURAL STATES: ICD-10-CM

## 2021-01-14 DIAGNOSIS — E78.00 PURE HYPERCHOLESTEROLEMIA, UNSPECIFIED: ICD-10-CM

## 2021-01-14 PROCEDURE — 99283 EMERGENCY DEPT VISIT LOW MDM: CPT

## 2021-01-14 NOTE — ED ADULT TRIAGE NOTE - CHIEF COMPLAINT QUOTE
pt states that he is (+) COVID as of 2 days ago  today comes in for post-nasal drip and worried he might develop bronchitis from covid

## 2021-01-14 NOTE — ED PROVIDER NOTE - OBJECTIVE STATEMENT
65M with past medical history of AF on xarelto, asthma, HTN, HLD presents for evaluation of COVID symptoms. PT diagnosed with COVID 2 days ago and now states that he has post nasal drip. Wife became concerned that he had bronchitis so sent him in. PT notes fever last 2 days ago and states improving cough, nonproductive. Denies chest pain, shortness of breath, nausea, vomiting, diarrhea, abd pain, dysuria. No exacerbating or relieving factors. Denies lower extremity pain or swelling, travel, immobilization, history of DVT/PE, hemoptysis.

## 2021-01-14 NOTE — ED PROVIDER NOTE - ATTENDING CONTRIBUTION TO CARE
I personally evaluated the patient. I reviewed the Resident’s or Physician Assistant’s note (as assigned above), and agree with the findings and plan except as documented in my note.    64 y/o M w hx of a fib, HTN presents for nasal congestion. Recently diagnosed w covid. No SOB, CP. No abd pain. No fever.     CONSTITUTIONAL: Well-developed; well-nourished; in no acute distress. Sitting up and providing appropriate history and physical examination  SKIN: skin exam is warm and dry, no acute rash.  HEAD: Normocephalic; atraumatic.  EYES: PERRL, 3 mm bilateral, no nystagmus, EOM intact; conjunctiva and sclera clear.  ENT: slight nasal congestion   NECK: Supple; non tender.+ full passive ROM in all directions. No JVD  CARD: S1, S2 normal; no murmurs, gallops, or rubs. Regular rate and rhythm. + Symmetric Strong Pulses  RESP: No wheezes, rales or rhonchi. Good air movement bilaterally  ABD: soft; non-distended; non-tender. No Rebound, No Gaurding, No signs of peritnitis, No CVA tenderness  EXT: Normal ROM. No clubbing, cyanosis or edema. Dp and Pt Pulses intact. Cap refill less than 3 seconds  NEURO: CN 2-12 intact, normal finger to nose, normal romberg, stable gait, no sensory or motor deficits, Alert, oriented, grossly unremarkable. No Focal deficits. GCS 15. NIH 0  PSYCH: Cooperative, appropriate.    Plan- pt is well appearing. Will DC.

## 2021-01-14 NOTE — ED PROVIDER NOTE - PATIENT PORTAL LINK FT
You can access the FollowMyHealth Patient Portal offered by [facility] by registering at the following website: [email or web address]. By joining [employer]’s FollowMyHealth portal, you will also be able to view your health information using other applications (apps) compatible with our system.

## 2021-01-14 NOTE — ED PROVIDER NOTE - NSFOLLOWUPCLINICS_GEN_ALL_ED_FT
[facility]  Medicine  [address], [place]   Phone: [phone]  Fax:   Follow Up Time: 1-3 Days

## 2021-01-14 NOTE — ED PROVIDER NOTE - PROGRESS NOTE DETAILS
SC: Ambulatory sat 96%. lung clear. Strict return precautions advised. Patient to be discharged from ED. Any available test results were discussed with patient and/or family. Verbal instructions given, including instructions to return to ED immediately for any new, worsening, or concerning symptoms. Patient endorsed understanding. Written discharge instructions additionally given, including follow-up plan.

## 2021-01-14 NOTE — ED ADULT NURSE NOTE - NSIMPLEMENTINTERV_GEN_ALL_ED
Implemented All Universal Safety Interventions:  [place] to call system. Call bell, personal items and telephone within reach. Instruct patient to call for assistance. Room bathroom lighting operational. Non-slip footwear when patient is off stretcher. Physically safe environment: no spills, clutter or unnecessary equipment. Stretcher in lowest position, wheels locked, appropriate side rails in place.

## 2021-01-14 NOTE — ED PROVIDER NOTE - PHYSICAL EXAMINATION
CONSTITUTIONAL: in no acute distress.   SKIN: warm, dry  HEAD: Normocephalic.  EYES: PERRL.  ENT: Airway clear.  NECK: Supple.  LYMPH: No acute cervical adenopathy.  CARD: Regular rate and rhythm.   RESP: No wheezing, rales or rhonchi.  ABD: soft ntnd  EXT: No clubbing, cyanosis. Claves nontender to palpation  NEURO: Alert, oriented.  PSYCH: Cooperative, appropriate.

## 2021-01-14 NOTE — ED PROVIDER NOTE - DISCHARGE REVIEW MATERIAL PRESENTED
development within normal limits.     P:    Immunization benefits and risks discussed, VIS given per protocol: Yes  Anticipatory guidance: information given and issues discussed
.

## 2021-01-14 NOTE — ED ADULT NURSE NOTE - OBJECTIVE STATEMENT
pt with (+) COVID x2 days  today with postnasal drip and states he fears he might get bronchitis from having covid

## 2021-03-22 NOTE — REVIEW OF SYSTEMS
[FreeTextEntry1] : Patient hard of hearing but able to tell me year is 2021.\par States he was going to tell me the month was July.\par Had trouble naming [place] until I showed him picture of Trump then said he was president, but when I showed him a picture of Biden he correctly named him and stated he was president.\par Apparently he knew Trump when Smooth was a child. Son confirms.\par \par He was able to ambulate w/o assistance.\par He was in good spirits and speech was fluent. [see HPI] : see HPI [Negative] : Heme/Lymph

## 2021-06-02 NOTE — ED ADULT NURSE NOTE - CAS ELECT INFOMATION PROVIDED
Discussed importance of advanced medical directives with patient. Patient is capable of making decisions.   Viky GAVIN DC instructions

## 2021-10-06 PROBLEM — I10 ESSENTIAL HYPERTENSION: Status: ACTIVE | Noted: 2017-06-03

## 2022-02-28 NOTE — PROGRESS NOTE ADULT - ASSESSMENT
63 yo M with pmh of CHF on lasix/spironolactone, AF on xarelto, HTN presenting with shortness of breath and cough for 3 days.    1) SOB and cough secondary to acute bronchitis  Patient was started as outpatient on levaquin. Continue antibiotics for a total of 7 days  Give symptomatic treatment: anti-tussive  Bronchodilators and prednisone taper  Pulm cs- will get HRCT; OP PFTs  -     2) Diastolic heart failure (chronic)  continue with lasix orally  2gr Na diet  daily weights and accurate Is/Os; f/u cardio OP    3) Afib  rate control & AC  on xarelto and metoprolol    3) HTN: stable  - c/w losartan    4) DLD  - c/w statin    DVT ppx: continue with xarelto    DISPO: from home when medically stable, full code  4/23 vs 4/24 63 yo M with pmh of CHF on lasix/spironolactone, AF on xarelto, HTN presenting with shortness of breath and cough for 3 days.    1)Recurrent SOB and cough secondary to acute bronchitis  - pt has h/o occupational exposure  Patient was started as outpatient on levaquin. Continue antibiotics for a total of 7 days  Give symptomatic treatment: anti-tussive  Bronchodilators and prednisone taper  Pulm cs- will get HRCT; OP PFTs  -     2) Diastolic heart failure (chronic)  continue with lasix orally  echo  2gr Na diet  daily weights and accurate Is/Os; f/u cardio OP    3) Afib  rate control & AC  on xarelto and metoprolol    3) HTN: stable  - c/w losartan    4) DLD  - c/w statin    DVT ppx: continue with xarelto    DISPO: from home when medically stable, full code  4/23 vs 4/24 Physical Exam    Vital Signs: I have reviewed the initial vital signs.  Constitutional: well-nourished, appears stated age, no acute distress  Eyes: PERRLA, and symmetrical lids.  ENT: Neck supple with no adenopathy, moist MM.  Cardiovascular: regular rate, regular rhythm, well-perfused extremities  Respiratory: unlabored respiratory effort, +rales R lower lobe, no tachypnea, no retractions, speaking full sentences  Gastrointestinal: soft, non-tender abdomen, no pulsatile mass  Musculoskeletal: supple neck, no lower extremity edema  Integumentary: warm, dry, no rash  Neurologic: extremities’ motor and sensory functions grossly intact  Psychiatric: A&Ox3

## 2022-04-06 NOTE — H&P ADULT - CLICK TO LAUNCH ORM
Patient calling stating he has surgery scheduled on the 8th with Dr Claire Knox for a hydrocelectomy and wanted to call to ensure that there has been no issues with his insurance and that the surgery was authorized; has medicare, informed patient I do not see any notes implying that there are any issues with surgery but would follow up with clinical to ease worry, please advise  .

## 2022-09-28 ENCOUNTER — NON-APPOINTMENT (OUTPATIENT)
Age: 67
End: 2022-09-28

## 2022-10-01 ENCOUNTER — NON-APPOINTMENT (OUTPATIENT)
Age: 67
End: 2022-10-01

## 2022-10-02 ENCOUNTER — EMERGENCY (EMERGENCY)
Facility: HOSPITAL | Age: 67
LOS: 0 days | Discharge: HOME | End: 2022-10-02
Attending: EMERGENCY MEDICINE | Admitting: EMERGENCY MEDICINE

## 2022-10-02 VITALS
WEIGHT: 250 LBS | TEMPERATURE: 98 F | HEART RATE: 110 BPM | DIASTOLIC BLOOD PRESSURE: 114 MMHG | OXYGEN SATURATION: 97 % | RESPIRATION RATE: 18 BRPM | SYSTOLIC BLOOD PRESSURE: 176 MMHG | HEIGHT: 71 IN

## 2022-10-02 DIAGNOSIS — Z79.01 LONG TERM (CURRENT) USE OF ANTICOAGULANTS: ICD-10-CM

## 2022-10-02 DIAGNOSIS — Z96.641 PRESENCE OF RIGHT ARTIFICIAL HIP JOINT: ICD-10-CM

## 2022-10-02 DIAGNOSIS — U07.1 COVID-19: ICD-10-CM

## 2022-10-02 DIAGNOSIS — I10 ESSENTIAL (PRIMARY) HYPERTENSION: ICD-10-CM

## 2022-10-02 DIAGNOSIS — I48.91 UNSPECIFIED ATRIAL FIBRILLATION: ICD-10-CM

## 2022-10-02 DIAGNOSIS — R50.9 FEVER, UNSPECIFIED: ICD-10-CM

## 2022-10-02 DIAGNOSIS — R05.9 COUGH, UNSPECIFIED: ICD-10-CM

## 2022-10-02 DIAGNOSIS — E66.9 OBESITY, UNSPECIFIED: ICD-10-CM

## 2022-10-02 DIAGNOSIS — Z98.890 OTHER SPECIFIED POSTPROCEDURAL STATES: Chronic | ICD-10-CM

## 2022-10-02 DIAGNOSIS — Z96.649 PRESENCE OF UNSPECIFIED ARTIFICIAL HIP JOINT: Chronic | ICD-10-CM

## 2022-10-02 DIAGNOSIS — E78.00 PURE HYPERCHOLESTEROLEMIA, UNSPECIFIED: ICD-10-CM

## 2022-10-02 DIAGNOSIS — J44.9 CHRONIC OBSTRUCTIVE PULMONARY DISEASE, UNSPECIFIED: ICD-10-CM

## 2022-10-02 PROCEDURE — 99283 EMERGENCY DEPT VISIT LOW MDM: CPT

## 2022-10-02 NOTE — ED PROVIDER NOTE - PHYSICAL EXAMINATION
CONSTITUTIONAL: Well-appearing; in no apparent distress.   HEAD: Normocephalic; atraumatic.   EYES: Pupils are round and reactive, extra-ocular muscles are intact. Eyelids are normal in appearance without swelling or lesions.   ENT: Hearing is intact with good acuity to spoken voice.  Patient is speaking clearly, not muffled and airway is intact.   RESPIRATORY: No signs of respiratory distress. Lung sounds are clear in all lobes bilaterally without rales, rhonchi, or wheezes.  CARDIOVASCULAR: Regular rate and rhythm.   GI: Abdomen is soft, non-tender, and without distention. Bowel sounds are present and normoactive in all four quadrants. No masses are noted.   NEURO: A & O x 3. Normal speech.   PSYCHOLOGICAL: Appropriate mood and affect. Good judgement and insight.

## 2022-10-02 NOTE — ED PROVIDER NOTE - PROGRESS NOTE DETAILS
Pt presents with asymptomatic HTN. Physical exam is benign. No intervention needed. Pt is stable for discharge.

## 2022-10-02 NOTE — ED ADULT NURSE NOTE - NSICDXFAMILYHX_GEN_ALL_CORE_FT
FAMILY HISTORY:  Father  Still living? Unknown  CAD (coronary artery disease), Age at diagnosis: Age Unknown    Sibling  Still living? Unknown  Family history of cancer, Age at diagnosis: Age Unknown

## 2022-10-02 NOTE — ED ADULT NURSE NOTE - CHIEF COMPLAINT QUOTE
Pt c/o high blood pressure today. Pt h/o htn (203/112), sent by outpt [facility] for further workup. Pt also (+)COVID-19 (9/29).     Denies any headaches/dizziness/change in vision.

## 2022-10-02 NOTE — ED ADULT TRIAGE NOTE - MEANS OF ARRIVAL
- Continue same pump settings   - Take Tresiba 15 U in case of pump failure.   - Continue Levothyroxine 75 mcg daily  - Continue Vitamin D supplements  - Remember Rotate pump site every 3 days  - Continue healthy diet and lifestyle modifications  - Schedule appointment with  PharmD for chronic disease management/insulin adjustment in 2 month  - Complete blood work as per orders at an ACL lab, schedule appointment for lab work in 3 months  - Follow up in 3-4 months  
ambulatory
No

## 2022-10-02 NOTE — ED PROVIDER NOTE - PATIENT PORTAL LINK FT
You can access the FollowMyHealth Patient Portal offered by [facility] by registering at the following website: [email or web address]. By joining [employer]’s FollowMyHealth portal, you will also be able to view your health information using other applications (apps) compatible with our system.

## 2022-10-02 NOTE — ED PROVIDER NOTE - CLINICAL SUMMARY MEDICAL DECISION MAKING FREE TEXT BOX
Patient presents with asymptomatic high blood pressure. Normal exam. Well appearing. Had normal cxr at urgent care prior to coming here. Discharged with pmd follow up and return precautions.

## 2022-10-02 NOTE — ED PROVIDER NOTE - ATTENDING APP SHARED VISIT CONTRIBUTION OF CARE
68 yo M pmh of af ib, htn, hld, asthma presents with high blood pressure. Patient was diagnosed with covid 1 week ago. + cough, + congestion, + fevers. no n/v/d. Went to urgent care center this morning for worsening cough. Was prescribed augmentin and prednisone and had cxr done. While there found to be hypertensive 203/112 so advised to come to the ED for evaluation. Patient denies any symptoms. no headache, no blurry vision, no chest pain, no shortness of breath, n/v.     CONSTITUTIONAL: Well-developed; well-nourished; in no acute distress.   SKIN: warm, dry  HEAD: Normocephalic; atraumatic.  EYES: PERRL, EOMI, no conjunctival erythema  ENT: No nasal discharge; airway clear.  NECK: Supple; non tender.  CARD: S1, S2 normal;  Regular rate and rhythm.   RESP: No wheezes, rales or rhonchi.  ABD: soft non tender, non distended, no rebound or guarding  EXT: Normal ROM.  5/5 strength in all 4 extremities   LYMPH: No acute cervical adenopathy.  NEURO: Alert, oriented, grossly unremarkable. neurovascularly intact  PSYCH: Cooperative, appropriate.

## 2022-10-02 NOTE — ED ADULT TRIAGE NOTE - CHIEF COMPLAINT QUOTE
Pt c/o high blood pressure today. Pt h/o htn (203/112), sent by outpt [facility] for further workup. Pt also (+)COVID-19 (9/29).     Denies any headaches/dizziness/change in vision.

## 2022-10-02 NOTE — ED PROVIDER NOTE - NS ED ATTENDING STATEMENT MOD
This was a shared visit with the SIENNA. I reviewed and verified the documentation and independently performed the documented:

## 2022-10-02 NOTE — ED PROVIDER NOTE - CARE PROVIDER_API CALL
Please make sure to follow up with your primary care doctor  in3 days,   Phone: (   )    -  Fax: (   )    -  Follow Up Time:

## 2022-10-02 NOTE — ED PROVIDER NOTE - PROVIDER TOKENS
FREE:[LAST:[Please make sure to follow up with your primary care doctor  in3 days],PHONE:[(   )    -],FAX:[(   )    -]]

## 2022-10-02 NOTE — ED PROVIDER NOTE - OBJECTIVE STATEMENT
67-year-old male with past medical history of A. fib on Xarelto, asthma, hypertension, and hyperlipidemia who presents with elevated blood pressure.  Reports that he checks his blood pressure on daily basis and baseline is 120 systolic; last normal reading was yesterday.  Reports that he woke up this morning and check his blood pressure with no medication and systolic was in the 200s..  Reports that he took his morning dose blood pressure medication and checked again and it was still elevated, so he decided to go to the urgent care and was told to come to the ED for further evaluation.  Denies fever, shortness of breath, chest pain, nausea, vomiting, abdominal pain, urinary symptoms, change in bowel movement.  Reports that he would not have noticed elevated blood pressure if he had not checked.

## 2022-10-02 NOTE — ED PROVIDER NOTE - NS ED ROS FT
Constitutional: Negative for fever, chills, and fatigue.  HENT: Negative for headache,   Eyes: Negative for eye pain, and vision change.  Cardiovascular: Negative for chest pain, and palpitation.  Respiratory: Negative for SOB, wheezing, cough and sputum production.  Gastrointestinal: Negative for nausea, vomiting, abdominal pain, constipation, diarrhea, hematochezia, and melena.  Genitourinary: Negative for flank pain, dysuria, frequency, and hematuria.  Neurological: Negative for dizziness, syncope, focal weakness, numbness, and loss of consciousness.  Musculoskeletal: Negative for joint swelling, arthralgias, back pain, neck pain, and calf cramps.  Hematological: bruise/bleed easily.

## 2022-10-02 NOTE — ED ADULT NURSE NOTE - NSICDXPASTMEDICALHX_GEN_ALL_CORE_FT
PAST MEDICAL HISTORY:  Asthma     Atrial fibrillation     COPD (chronic obstructive pulmonary disease)     HTN (hypertension)     Hypercholesteremia     Obesity     Pulmonary edema cardiac cause

## 2022-10-02 NOTE — ED ADULT NURSE NOTE - OBJECTIVE STATEMENT
Patient with hx of HTN, HLD and COPD was found hypertensive at Urgent Care despite medication. Patient was told to complete cardiac w/u at ER.

## 2023-03-23 NOTE — ED ADULT NURSE NOTE - RN DISCHARGE SIGNATURE
Chief Complaint   Patient presents with   • Difficulty Swallowing       History of Present Illness:   59 y.o. male who was sent by ENT for trouble swallowing x 9 mos. He was put on Pantoprazole 40 mg/day and it didn't help. Food may have trouble swallowing and may have to drink water. His brother was diagnosed with cancer at the junction of the stomach and esophagus and that bothers the patient. No nausea or vomiting. Nonsmoker. ETOH: 2-4 drinks daily. No diarrhea, no constipation. No melena or rectal bleeding. Weight stable.        He last had a colonoscopy in 12 of 2021 by Dr. Ronda Hernandez.  She recommended a repeat colonoscopy in 5 years because of a family history of colon polyps.    Past Medical History:   Diagnosis Date   • Arthritis    • High blood pressure    • Prostate cancer (HCC)        Past Surgical History:   Procedure Laterality Date   • COLONOSCOPY  07/2013    Dr Mary Rabago ( Fam. Hx of Polyps)   • COLONOSCOPY N/A 12/15/2021    ENTIRE COLON WNL, RESCOPE IN 5 YRS, DR. MICHAEL HERNANDEZ AT [facility]   • FOOT SURGERY      9/2014 and 8/2010         Current Outpatient Medications:   •  albuterol sulfate HFA (Proventil HFA) 108 (90 Base) MCG/ACT inhaler, Inhale 2 puffs Every 4 (Four) Hours As Needed for Wheezing., Disp: 6.7 g, Rfl: 5  •  amLODIPine (NORVASC) 2.5 MG tablet, TAKE 1 TABLET BY MOUTH EVERY DAY, Disp: 90 tablet, Rfl: 3  •  ASPIRIN 81 PO, Take 81 mg by mouth Daily., Disp: , Rfl:   •  betamethasone, augmented, (DIPROLENE) 0.05 % lotion, PLEASE SEE ATTACHED FOR DETAILED DIRECTIONS, Disp: , Rfl:   •  clobetasol propionate (CLOBEX) 0.05 % shampoo, LATHER INTO SCALP 4-5 TIMES WEEKLY, LET SIT FOR 10 MINUTES, AND RINSE THOROUGHLY, Disp: , Rfl:   •  desonide (DESOWEN) 0.05 % cream, APPLY BY TOPICAL ROUTE 2 TIMES EVERY DAY TO THE AFFECTED AREAS OF EARS, Disp: , Rfl:   •  fluocinonide (LIDEX) 0.05 % external solution, , Disp: , Rfl:   •  losartan (COZAAR) 100 MG tablet, Take 1 tablet by mouth Daily., Disp: 90 tablet,  Rfl: 3  •  meloxicam (MOBIC) 15 MG tablet, TAKE 1 TABLET BY MOUTH EVERY DAY, Disp: 90 tablet, Rfl: 1  •  multivitamin with minerals tablet tablet, Take 1 tablet by mouth Daily., Disp: , Rfl:   •  Omega-3 Fatty Acids (fish oil) 1000 MG capsule capsule, Take 1,200 mg by mouth Daily With Breakfast., Disp: , Rfl:   •  pantoprazole (PROTONIX) 40 MG EC tablet, TAKE 1 TABLET BY MOUTH EVERY DAY ROUGHLY AN HOUR BEFORE DINNER, Disp: , Rfl:   •  rosuvastatin (CRESTOR) 5 MG tablet, Take 0.5 tablets by mouth Daily. Take 1/2 tablet daily, Disp: 45 tablet, Rfl: 3  •  vitamin B-12 (CYANOCOBALAMIN) 1000 MCG tablet, Take 1 tablet by mouth Daily., Disp: , Rfl:     No Known Allergies    Family History   Problem Relation Age of Onset   • No Known Problems Mother    • Heart failure Father    • Alcohol abuse Father    • Cancer Brother         esophageal/stomach       Social History     Socioeconomic History   • Marital status:    • Number of children: 2   • Years of education: COLLEGE    Tobacco Use   • Smoking status: Never   • Smokeless tobacco: Never   Vaping Use   • Vaping Use: Never used   Substance and Sexual Activity   • Alcohol use: Yes     Alcohol/week: 10.0 standard drinks     Types: 5 Glasses of wine, 5 Drinks containing 0.5 oz of alcohol per week   • Drug use: No   • Sexual activity: Yes     Partners: Female     Birth control/protection: None       Review of Systems   Gastrointestinal: Negative for abdominal pain.   All other systems reviewed and are negative.    Pertinent positives and negatives documented in the HPI and all other systems reviewed and were found to be negative.  Vitals:    03/23/23 0919   BP: 164/95   Pulse: 68   Temp: 97.8 °F (36.6 °C)   SpO2: 96%       Physical Exam  Vitals reviewed.   Constitutional:       General: He is not in acute distress.     Appearance: Normal appearance. He is well-developed. He is not diaphoretic.   HENT:      Head: Normocephalic and atraumatic. Hair is normal.      Right  Ear: Hearing, tympanic membrane, ear canal and external ear normal.      Left Ear: Hearing, tympanic membrane, ear canal and external ear normal.      Nose: Nose normal. No nasal deformity.      Mouth/Throat:      Mouth: Mucous membranes are moist. No oral lesions.      Pharynx: Uvula midline. No uvula swelling.   Eyes:      General: Lids are normal. No scleral icterus.        Right eye: No discharge.         Left eye: No discharge.      Extraocular Movements: Extraocular movements intact.      Right eye: Normal extraocular motion and no nystagmus.      Left eye: Normal extraocular motion and no nystagmus.      Conjunctiva/sclera: Conjunctivae normal.      Pupils: Pupils are equal, round, and reactive to light.   Neck:      Thyroid: No thyromegaly.      Vascular: No JVD.   Cardiovascular:      Rate and Rhythm: Normal rate and regular rhythm.      Pulses: Normal pulses.      Heart sounds: Normal heart sounds. No murmur heard.    No gallop.   Pulmonary:      Effort: Pulmonary effort is normal. No respiratory distress.      Breath sounds: Normal breath sounds. No wheezing or rales.   Chest:      Chest wall: No tenderness.   Abdominal:      General: Bowel sounds are normal. There is no distension.      Palpations: Abdomen is soft. There is no mass.      Tenderness: There is no abdominal tenderness. There is no guarding.      Hernia: No hernia is present.   Musculoskeletal:         General: No tenderness or deformity. Normal range of motion.      Cervical back: Normal range of motion and neck supple.   Lymphadenopathy:      Cervical: No cervical adenopathy.   Skin:     General: Skin is warm and dry.      Findings: No rash.   Neurological:      Mental Status: He is alert and oriented to person, place, and time.      Cranial Nerves: No cranial nerve deficit.      Motor: No abnormal muscle tone.      Coordination: Coordination normal.      Deep Tendon Reflexes: Reflexes are normal and symmetric. Reflexes normal.    Psychiatric:         Mood and Affect: Mood normal.         Behavior: Behavior normal.         Thought Content: Thought content normal.         Judgment: Judgment normal.         Diagnoses and all orders for this visit:    1. Family history of colonic polyps (Primary)    2. Dysphagia, unspecified type  -     FL Esophagram Complete Single Contrast; Future      Assessment:  1. Dysphagia  2. He probably drinks too much ETOH.  3. FH (bro) cancer at the GE junction  4.     Recommendations:  1. Barium swallow  2. He probably drinks too much  3. He is to have labs drawn in the next little while by his PCP.  4. Patient to call for results of bar swallow. We will probably do an EGD with esophageal dilation    No follow-ups on file.    Ramez Patel MD  3/23/2023   18-Apr-2019

## 2023-06-07 NOTE — DISCHARGE NOTE ADULT - FUNCTIONAL SCREEN CURRENT LEVEL: AMBULATION, MLM
(0) independent Albendazole Counseling:  I discussed with the patient the risks of albendazole including but not limited to cytopenia, kidney damage, nausea/vomiting and severe allergy.  The patient understands that this medication is being used in an off-label manner.

## 2024-01-14 ENCOUNTER — EMERGENCY (EMERGENCY)
Facility: HOSPITAL | Age: 69
LOS: 0 days | Discharge: HOME | End: 2024-01-14
Attending: EMERGENCY MEDICINE
Payer: MEDICARE

## 2024-01-14 VITALS
HEART RATE: 77 BPM | DIASTOLIC BLOOD PRESSURE: 98 MMHG | HEIGHT: 72 IN | TEMPERATURE: 98 F | WEIGHT: 250 LBS | RESPIRATION RATE: 18 BRPM | OXYGEN SATURATION: 99 % | SYSTOLIC BLOOD PRESSURE: 212 MMHG

## 2024-01-14 VITALS — DIASTOLIC BLOOD PRESSURE: 101 MMHG | SYSTOLIC BLOOD PRESSURE: 180 MMHG

## 2024-01-14 DIAGNOSIS — Z96.649 PRESENCE OF UNSPECIFIED ARTIFICIAL HIP JOINT: Chronic | ICD-10-CM

## 2024-01-14 DIAGNOSIS — I10 ESSENTIAL (PRIMARY) HYPERTENSION: ICD-10-CM

## 2024-01-14 DIAGNOSIS — M25.512 PAIN IN LEFT SHOULDER: ICD-10-CM

## 2024-01-14 DIAGNOSIS — E78.5 HYPERLIPIDEMIA, UNSPECIFIED: ICD-10-CM

## 2024-01-14 DIAGNOSIS — Z98.890 OTHER SPECIFIED POSTPROCEDURAL STATES: Chronic | ICD-10-CM

## 2024-01-14 DIAGNOSIS — I48.91 UNSPECIFIED ATRIAL FIBRILLATION: ICD-10-CM

## 2024-01-14 DIAGNOSIS — J44.9 CHRONIC OBSTRUCTIVE PULMONARY DISEASE, UNSPECIFIED: ICD-10-CM

## 2024-01-14 DIAGNOSIS — Z79.01 LONG TERM (CURRENT) USE OF ANTICOAGULANTS: ICD-10-CM

## 2024-01-14 PROCEDURE — 93005 ELECTROCARDIOGRAM TRACING: CPT

## 2024-01-14 PROCEDURE — 73030 X-RAY EXAM OF SHOULDER: CPT | Mod: LT

## 2024-01-14 PROCEDURE — 73030 X-RAY EXAM OF SHOULDER: CPT | Mod: 26,LT

## 2024-01-14 PROCEDURE — 99284 EMERGENCY DEPT VISIT MOD MDM: CPT

## 2024-01-14 PROCEDURE — 99283 EMERGENCY DEPT VISIT LOW MDM: CPT | Mod: 25

## 2024-01-14 PROCEDURE — 93010 ELECTROCARDIOGRAM REPORT: CPT

## 2024-01-14 RX ORDER — IBUPROFEN 200 MG
600 TABLET ORAL ONCE
Refills: 0 | Status: DISCONTINUED | OUTPATIENT
Start: 2024-01-14 | End: 2024-01-14

## 2024-01-14 NOTE — ED PROVIDER NOTE - OBJECTIVE STATEMENT
68-year-old male with past medical history of HDL, asthma, COPD, A-fib hypertension presenting with left shoulder pain and high blood pressure.  Patient reports he woke up with the left shoulder pain and does not recall any trauma or lifting anything heavy.  The left shoulder pain started this morning and then he took his blood pressure earlier this morning was normal.  Patient normally takes his blood pressure 2 times a day and his nighttime pressure was elevated.  Denies chest pain, shortness of breath, headache, dizziness, numbness or tingling, nausea, vomiting, diaphoresis, abdominal pain, diarrhea constipation,  symptoms.  Patient has normal strength and forage motion at the shoulder and just complains of some mild anterior shoulder pain.

## 2024-01-14 NOTE — ED ADULT NURSE NOTE - CHIEF COMPLAINT QUOTE
Patient states hypertension on home monitor SBP >190. Patient denies headache, dizziness, visual disturbances. Patient with known hypertension and AFIb and compliant with medication. Patient also complaining of L shoulder pain and discomfort.

## 2024-01-14 NOTE — ED PROVIDER NOTE - PHYSICAL EXAMINATION
Constitutional: Well developed, well nourished, no acute distress  Head: Normocephalic, Atraumatic  Eyes: PERRLA, EOMI, conjunctiva and sclera WNL  ENT: Moist mucous membranes, no rhinorrhea,  Neck: Supple, Nontender  Respiratory: Normal chest excursion with respiration; Breath sounds clear and equal B/L; No wheezes, rales, or rhonchi   Cardiovascular: RRR; Normal S1, S2; No murmurs, rubs or gallops   ABD/GI:  Nondistended; Nontender; No guarding, rigidity or rebound;   EXT/MS: Moving all extremities; Distal pulses 2+ B/L;Tenderness to left anterior shoulder, 5 out of 5 strength in bilateral upper extremities full range of motion of the left shoulder, left elbow and left wrist.  5  strength bilaterally, sensation intact at the left upper extremity, no tenderness over left clavicle, left humerus or left elbow  Skin: Normal for age and race; Warm and dry; No rash  Neurologic: AAO x 4;   Normal motor and sensory function  Psychiatric: Appropriate affect, normal mood

## 2024-01-14 NOTE — ED PROVIDER NOTE - PATIENT PORTAL LINK FT
You can access the FollowMyHealth Patient Portal offered by [facility] by registering at the following website: [email or web address]. By joining [employer]’s FollowMyHealth portal, you will also be able to view your health information using other applications (apps) compatible with our system. You can access the FollowMyHealth Patient Portal offered by [facility] by registering at the following website: [email or web address]. By joining [employer]’s FollowMyHealth portal, you will also be able to view your health information using other applications (apps) compatible with our system.

## 2024-01-14 NOTE — ED ADULT NURSE NOTE - NSFALLUNIVINTERV_ED_ALL_ED
Bed/Stretcher in lowest position, wheels locked, appropriate side rails in place/Call bell, personal items and telephone in reach/Instruct patient to call for assistance before getting out of bed/chair/stretcher/Non-slip footwear applied when patient is off stretcher/[place] to call system/Physically safe environment - no spills, clutter or unnecessary equipment/Purposeful proactive rounding/Room/bathroom lighting operational, light cord in reach Bed/Stretcher in lowest position, wheels locked, appropriate side rails in place/Call bell, personal items and telephone in reach/Instruct patient to call for assistance before getting out of bed/chair/stretcher/Non-slip footwear applied when patient is off stretcher/[place] to call system/Physically safe environment - no spills, clutter or unnecessary equipment/Purposeful proactive rounding/Room/bathroom lighting operational, light cord in reach Bed/Stretcher in lowest position, wheels locked, appropriate side rails in place/Call bell, personal items and telephone in reach/Instruct patient to call for assistance before getting out of bed/chair/stretcher/Non-slip footwear applied when patient is off stretcher/[place] to call system/Physically safe environment - no spills, clutter or unnecessary equipment/Purposeful proactive rounding/Room/bathroom lighting operational, light cord in reach

## 2024-01-14 NOTE — ED PROVIDER NOTE - ATTENDING CONTRIBUTION TO CARE
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  Patient also reports left shoulder pain.  States that this morning when he woke up he noted pain to the left shoulder.  Wife states that yesterday he was vacuuming and moving some furniture around.  Denies any numbness tingling or weakness.  No chest pain shortness of breath or palpitations.  No headaches.  No nausea vomiting.  No numbness or weakness.  States that this morning his blood pressure was normal.  This evening when he checked it was elevated in the 200s so came in for evaluation.  Last dose of metoprolol was at 6:30 PM.    CONSTITUTIONAL: Well-developed; well-nourished; in no acute distress.   SKIN: warm, dry  HEAD: Normocephalic; atraumatic.  EYES: PERRL, EOMI, no conjunctival erythema  ENT: No nasal discharge; airway clear.  NECK: Supple; non tender.  CARD: S1, S2 normal;  Regular rate and rhythm.   RESP: No wheezes, rales or rhonchi.  ABD: soft non tender, non distended, no rebound or guarding  EXT: Normal ROM.  5/5 strength in all 4 extremities. + tenderness to the anterior and superior left shoulder. 5/5 strength. 2+ pulses.   LYMPH: No acute cervical adenopathy.  NEURO: Alert, oriented, grossly unremarkable. neurovascularly intact  PSYCH: Cooperative, appropriate. 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  Patient also reports left shoulder pain.  States that this morning when he woke up he noted pain to the left shoulder.  Wife states that yesterday he was vacuuming and moving some furniture around.  Denies any numbness tingling or weakness.  No chest pain shortness of breath or palpitations.  No headaches.  No nausea vomiting.  No numbness or weakness.  States that this morning his blood pressure was normal.  This evening when he checked it was elevated in the 200s so came in for evaluation.  Last dose of metoprolol was at 6:30 PM.    CONSTITUTIONAL: Well-developed; well-nourished; in no acute distress.   SKIN: warm, dry  HEAD: Normocephalic; atraumatic.  EYES: PERRL, EOMI, no conjunctival erythema  ENT: No nasal discharge; airway clear.  NECK: Supple; non tender.  CARD: S1, S2 normal;  Regular rate and rhythm.   RESP: No wheezes, rales or rhonchi.  ABD: soft non tender, non distended, no rebound or guarding  EXT: Normal ROM.  5/5 strength in all 4 extremities. + tenderness to the anterior and superior left shoulder. 5/5 strength. 2+ pulses.   LYMPH: No acute cervical adenopathy.  NEURO: Alert, oriented, grossly unremarkable. neurovascularly intact  PSYCH: Cooperative, appropriate.

## 2024-01-14 NOTE — ED ADULT TRIAGE NOTE - CHIEF COMPLAINT QUOTE
Patient states hypertension on home monitor SBP >190. Patient denies headache, dizziness, visual disturbances. Patient with known hypertension and compliant with medication. Patient also complaining of L shoulder pain and discomfort. Patient states hypertension on home monitor SBP >190. Patient denies headache, dizziness, visual disturbances. Patient with known hypertension and AFIb and compliant with medication. Patient also complaining of L shoulder pain and discomfort.

## 2024-01-14 NOTE — ED PROVIDER NOTE - CLINICAL SUMMARY MEDICAL DECISION MAKING FREE TEXT BOX
Patient presents with asymptomatic hypertension.  Normal blood pressure in the morning.  Noted to have elevated pressure in the 200s noted when he checked this evening. Patient separately reports left shoulder pain since he woke up this morning.  Positive reproducible tenderness over the anterior and superior left shoulder.  X-ray negative.  Blood pressure improved in the ED.  Discharged with PMD follow-up and return precautions.

## 2025-02-26 NOTE — DISCHARGE NOTE ADULT - CARE PROVIDERS DIRECT ADDRESSES
CT chest 2/14/26    Continue with inhalers and cpap     Follow up in clinic in 6 months      
,[email or web address]

## 2025-04-01 ENCOUNTER — APPOINTMENT (OUTPATIENT)
Dept: ORTHOPEDIC SURGERY | Facility: CLINIC | Age: 70
End: 2025-04-01
Payer: MEDICARE

## 2025-04-01 ENCOUNTER — TRANSCRIPTION ENCOUNTER (OUTPATIENT)
Age: 70
End: 2025-04-01

## 2025-04-01 DIAGNOSIS — M16.12 UNILATERAL PRIMARY OSTEOARTHRITIS, LEFT HIP: ICD-10-CM

## 2025-04-01 PROCEDURE — 99203 OFFICE O/P NEW LOW 30 MIN: CPT

## 2025-04-01 PROCEDURE — 73521 X-RAY EXAM HIPS BI 2 VIEWS: CPT

## 2025-04-27 NOTE — ED ADULT NURSE NOTE - INTEGUMENTARY WDL
Patient is a 82y old  Male who presents with a chief complaint of "Having stones removed from both kidneys, possibly new stent placed, and open prostate" (24 Apr 2025 07:26)    Interval Events:  Patient seen and examined at bedside. Patient resting comfortably while eating breakfast. TOV done midnight last night, since then patient is voiding independently. Complaining of some bladder discomfort with urination and fullness. Denies dysuria. Admits to clear pink hematuria with no clots. Otherwise no complaints. Denies fevers, chills, abdominal pain, flank pain, nausea, or vomiting.     MEDICATIONS:  MEDICATIONS  (STANDING):  amLODIPine   Tablet 5 milliGRAM(s) Oral daily  ampicillin/sulbactam  IVPB 3 Gram(s) IV Intermittent every 12 hours  aspirin enteric coated 81 milliGRAM(s) Oral daily  atorvastatin 80 milliGRAM(s) Oral at bedtime  donepezil 10 milliGRAM(s) Oral at bedtime  finasteride 5 milliGRAM(s) Oral daily  heparin   Injectable 5000 Unit(s) SubCutaneous every 8 hours  metoprolol succinate ER 25 milliGRAM(s) Oral daily  polyethylene glycol 3350 17 Gram(s) Oral daily  senna 1 Tablet(s) Oral at bedtime  spironolactone 25 milliGRAM(s) Oral daily  tamsulosin 0.4 milliGRAM(s) Oral at bedtime    MEDICATIONS  (PRN):  acetaminophen     Tablet .. 650 milliGRAM(s) Oral every 6 hours PRN Temp greater or equal to 38C (100.4F), Mild Pain (1 - 3)      Allergies    No Known Allergies    Intolerances        T(C): 36.7 (04-27-25 @ 05:20), Max: 37.1 (04-26-25 @ 04:30)  T(F): 98.1 (04-27-25 @ 05:20), Max: 98.7 (04-26-25 @ 04:30)  HR: 58 (04-27-25 @ 05:20) (58 - 65)  BP: 137/71 (04-27-25 @ 05:20) (112/64 - 147/78)  RR: 17 (04-27-25 @ 05:20) (16 - 17)  SpO2: 95% (04-27-25 @ 05:20) (94% - 98%)          04-25-25 @ 07:01  -  04-26-25 @ 07:00  --------------------------------------------------------  IN:  Total IN: 0 mL    OUT:    Indwelling Catheter - Urethral (mL): 5200 mL  Total OUT: 5200 mL    Total NET: -5200 mL      04-26-25 @ 07:01  -  04-27-25 @ 07:00  --------------------------------------------------------  IN:  Total IN: 0 mL    OUT:    Indwelling Catheter - Urethral (mL): 1600 mL  Total OUT: 1600 mL    Total NET: -1600 mL          LABS:      CBC Full  -  ( 27 Apr 2025 07:38 )  WBC Count : 13.35 K/uL  RBC Count : 4.19 M/uL  Hemoglobin : 10.1 g/dL  Hematocrit : 32.1 %  Platelet Count - Automated : 363 K/uL  Mean Cell Volume : 76.6 fl  Mean Cell Hemoglobin : 24.1 pg  Mean Cell Hemoglobin Concentration : 31.5 g/dL  Auto Neutrophil # : x  Auto Lymphocyte # : x  Auto Monocyte # : x  Auto Eosinophil # : x  Auto Basophil # : x  Auto Neutrophil % : x  Auto Lymphocyte % : x  Auto Monocyte % : x  Auto Eosinophil % : x  Auto Basophil % : x    04-27    140  |  107  |  25[H]  ----------------------------<  127[H]  4.1   |  29  |  1.50[H]    Ca    9.0      27 Apr 2025 07:38          Urinalysis Basic - ( 27 Apr 2025 07:38 )    Color: x / Appearance: x / SG: x / pH: x  Gluc: 127 mg/dL / Ketone: x  / Bili: x / Urobili: x   Blood: x / Protein: x / Nitrite: x   Leuk Esterase: x / RBC: x / WBC x   Sq Epi: x / Non Sq Epi: x / Bacteria: x        Physical Exam  Constitutional: alert, no acute distress  Abdomen: soft, nontender, nondistended  Genitourinary: no bladder distention, no suprapubic tenderness           Color consistent with ethnicity/race, warm, dry intact, resilient.